# Patient Record
Sex: MALE | Race: WHITE | Employment: FULL TIME | URBAN - METROPOLITAN AREA
[De-identification: names, ages, dates, MRNs, and addresses within clinical notes are randomized per-mention and may not be internally consistent; named-entity substitution may affect disease eponyms.]

---

## 2018-03-05 ENCOUNTER — HOSPITAL ENCOUNTER (OUTPATIENT)
Dept: ULTRASOUND IMAGING | Age: 48
Discharge: HOME | End: 2018-03-05

## 2018-03-05 DIAGNOSIS — Z12.71 ENCOUNTER FOR SCREENING FOR MALIGNANT NEOPLASM OF TESTIS: ICD-10-CM

## 2018-10-10 ENCOUNTER — HOSPITAL ENCOUNTER (INPATIENT)
Age: 48
LOS: 7 days | Discharge: HOME | DRG: 432 | End: 2018-10-18
Attending: EMERGENCY MEDICINE | Admitting: INTERNAL MEDICINE
Payer: COMMERCIAL

## 2018-10-10 DIAGNOSIS — N30.00 ACUTE CYSTITIS WITHOUT HEMATURIA: ICD-10-CM

## 2018-10-10 DIAGNOSIS — F10.930 ALCOHOL WITHDRAWAL SYNDROME WITHOUT COMPLICATION (HCC): ICD-10-CM

## 2018-10-10 DIAGNOSIS — K70.10 ALCOHOLIC HEPATITIS, UNSPECIFIED WHETHER ASCITES PRESENT: Primary | ICD-10-CM

## 2018-10-10 PROCEDURE — 85027 COMPLETE CBC AUTOMATED: CPT

## 2018-10-10 PROCEDURE — 99284 EMERGENCY DEPT VISIT MOD MDM: CPT

## 2018-10-10 PROCEDURE — 86708 HEPATITIS A ANTIBODY: CPT

## 2018-10-10 PROCEDURE — G0480 DRUG TEST DEF 1-7 CLASSES: HCPCS

## 2018-10-10 PROCEDURE — 80053 COMPREHEN METABOLIC PANEL: CPT

## 2018-10-10 PROCEDURE — 87086 URINE CULTURE/COLONY COUNT: CPT

## 2018-10-10 PROCEDURE — 82977 ASSAY OF GGT: CPT

## 2018-10-10 PROCEDURE — 81001 URINALYSIS AUTO W/SCOPE: CPT

## 2018-10-10 RX ORDER — BUPROPION HYDROCHLORIDE 150 MG/1
150 TABLET, EXTENDED RELEASE ORAL ONCE
Status: ON HOLD | COMMUNITY
End: 2018-10-18 | Stop reason: HOSPADM

## 2018-10-10 RX ORDER — METOPROLOL SUCCINATE 25 MG/1
TABLET, EXTENDED RELEASE ORAL DAILY
Status: ON HOLD | COMMUNITY
End: 2018-11-20 | Stop reason: HOSPADM

## 2018-10-10 ASSESSMENT — PAIN SCALES - GENERAL: PAINLEVEL_OUTOF10: 1

## 2018-10-11 ENCOUNTER — APPOINTMENT (OUTPATIENT)
Dept: ULTRASOUND IMAGING | Age: 48
DRG: 432 | End: 2018-10-11
Payer: COMMERCIAL

## 2018-10-11 PROBLEM — R10.11 RUQ PAIN: Status: ACTIVE | Noted: 2018-10-11

## 2018-10-11 PROBLEM — E80.6 HYPERBILIRUBINEMIA: Status: ACTIVE | Noted: 2018-10-11

## 2018-10-11 LAB
A/G RATIO: 0.7 (ref 1.1–2.2)
A/G RATIO: 0.8 (ref 1.1–2.2)
ACETAMINOPHEN LEVEL: <5 UG/ML (ref 10–30)
ALBUMIN SERPL-MCNC: 2.8 G/DL (ref 3.4–5)
ALBUMIN SERPL-MCNC: 2.8 G/DL (ref 3.4–5)
ALP BLD-CCNC: 237 U/L (ref 40–129)
ALP BLD-CCNC: 241 U/L (ref 40–129)
ALT SERPL-CCNC: 32 U/L (ref 10–40)
ALT SERPL-CCNC: 32 U/L (ref 10–40)
AMMONIA: 171 UMOL/L (ref 16–60)
ANION GAP SERPL CALCULATED.3IONS-SCNC: 13 MMOL/L (ref 3–16)
ANION GAP SERPL CALCULATED.3IONS-SCNC: 15 MMOL/L (ref 3–16)
AST SERPL-CCNC: 123 U/L (ref 15–37)
AST SERPL-CCNC: 129 U/L (ref 15–37)
BACTERIA: ABNORMAL /HPF
BILIRUB SERPL-MCNC: 20.2 MG/DL (ref 0–1)
BILIRUB SERPL-MCNC: 20.2 MG/DL (ref 0–1)
BILIRUBIN URINE: ABNORMAL
BLOOD, URINE: ABNORMAL
BUN BLDV-MCNC: 4 MG/DL (ref 7–20)
BUN BLDV-MCNC: 6 MG/DL (ref 7–20)
CALCIUM SERPL-MCNC: 8 MG/DL (ref 8.3–10.6)
CALCIUM SERPL-MCNC: 8.2 MG/DL (ref 8.3–10.6)
CASTS: ABNORMAL /LPF
CHLORIDE BLD-SCNC: 91 MMOL/L (ref 99–110)
CHLORIDE BLD-SCNC: 93 MMOL/L (ref 99–110)
CLARITY: ABNORMAL
CO2: 21 MMOL/L (ref 21–32)
CO2: 24 MMOL/L (ref 21–32)
COLOR: ABNORMAL
CREAT SERPL-MCNC: 1.2 MG/DL (ref 0.9–1.3)
CREAT SERPL-MCNC: <0.5 MG/DL (ref 0.9–1.3)
ETHANOL: 25 MG/DL (ref 0–0.08)
GAMMA GLUTAMYL TRANSFERASE: 478 U/L (ref 8–61)
GFR AFRICAN AMERICAN: >60
GFR AFRICAN AMERICAN: >60
GFR NON-AFRICAN AMERICAN: >60
GFR NON-AFRICAN AMERICAN: >60
GLOBULIN: 3.6 G/DL
GLOBULIN: 3.8 G/DL
GLUCOSE BLD-MCNC: 119 MG/DL (ref 70–99)
GLUCOSE BLD-MCNC: 81 MG/DL (ref 70–99)
GLUCOSE URINE: 100 MG/DL
HAV IGM SER IA-ACNC: NORMAL
HCT VFR BLD CALC: 43.2 % (ref 40.5–52.5)
HEMOGLOBIN: 15 G/DL (ref 13.5–17.5)
HEPATITIS B CORE IGM ANTIBODY: NORMAL
HEPATITIS B SURFACE ANTIGEN INTERPRETATION: NORMAL
HEPATITIS C ANTIBODY INTERPRETATION: NORMAL
INR BLD: 2.11 (ref 0.86–1.14)
KETONES, URINE: ABNORMAL MG/DL
LEUKOCYTE ESTERASE, URINE: ABNORMAL
MAGNESIUM: 1.5 MG/DL (ref 1.8–2.4)
MCH RBC QN AUTO: 34 PG (ref 26–34)
MCHC RBC AUTO-ENTMCNC: 34.8 G/DL (ref 31–36)
MCV RBC AUTO: 97.7 FL (ref 80–100)
MICROSCOPIC EXAMINATION: YES
MUCUS: ABNORMAL /LPF
NITRITE, URINE: POSITIVE
PDW BLD-RTO: 16.9 % (ref 12.4–15.4)
PH UA: 6.5
PLATELET # BLD: 90 K/UL (ref 135–450)
PLATELET SLIDE REVIEW: ABNORMAL
PMV BLD AUTO: 9 FL (ref 5–10.5)
POTASSIUM REFLEX MAGNESIUM: 3.5 MMOL/L (ref 3.5–5.1)
POTASSIUM SERPL-SCNC: 3.6 MMOL/L (ref 3.5–5.1)
PROTEIN UA: ABNORMAL MG/DL
PROTHROMBIN TIME: 24 SEC (ref 9.8–13)
RBC # BLD: 4.42 M/UL (ref 4.2–5.9)
RBC UA: ABNORMAL /HPF (ref 0–2)
SLIDE REVIEW: ABNORMAL
SODIUM BLD-SCNC: 128 MMOL/L (ref 136–145)
SODIUM BLD-SCNC: 129 MMOL/L (ref 136–145)
SPECIFIC GRAVITY UA: 1.02
TOTAL PROTEIN: 6.4 G/DL (ref 6.4–8.2)
TOTAL PROTEIN: 6.6 G/DL (ref 6.4–8.2)
URINE REFLEX TO CULTURE: YES
URINE TYPE: ABNORMAL
UROBILINOGEN, URINE: 4 E.U./DL
WBC # BLD: 10.8 K/UL (ref 4–11)
WBC UA: ABNORMAL /HPF (ref 0–5)

## 2018-10-11 PROCEDURE — 82140 ASSAY OF AMMONIA: CPT

## 2018-10-11 PROCEDURE — G0480 DRUG TEST DEF 1-7 CLASSES: HCPCS

## 2018-10-11 PROCEDURE — 85610 PROTHROMBIN TIME: CPT

## 2018-10-11 PROCEDURE — 6370000000 HC RX 637 (ALT 250 FOR IP): Performed by: INTERNAL MEDICINE

## 2018-10-11 PROCEDURE — 96365 THER/PROPH/DIAG IV INF INIT: CPT

## 2018-10-11 PROCEDURE — 80074 ACUTE HEPATITIS PANEL: CPT

## 2018-10-11 PROCEDURE — 2580000003 HC RX 258: Performed by: INTERNAL MEDICINE

## 2018-10-11 PROCEDURE — 6360000002 HC RX W HCPCS: Performed by: EMERGENCY MEDICINE

## 2018-10-11 PROCEDURE — 6360000002 HC RX W HCPCS: Performed by: INTERNAL MEDICINE

## 2018-10-11 PROCEDURE — 2580000003 HC RX 258: Performed by: EMERGENCY MEDICINE

## 2018-10-11 PROCEDURE — 2500000003 HC RX 250 WO HCPCS: Performed by: INTERNAL MEDICINE

## 2018-10-11 PROCEDURE — 76705 ECHO EXAM OF ABDOMEN: CPT

## 2018-10-11 PROCEDURE — 83735 ASSAY OF MAGNESIUM: CPT

## 2018-10-11 PROCEDURE — 80053 COMPREHEN METABOLIC PANEL: CPT

## 2018-10-11 PROCEDURE — 36415 COLL VENOUS BLD VENIPUNCTURE: CPT

## 2018-10-11 PROCEDURE — 87086 URINE CULTURE/COLONY COUNT: CPT

## 2018-10-11 PROCEDURE — 96375 TX/PRO/DX INJ NEW DRUG ADDON: CPT

## 2018-10-11 PROCEDURE — 1200000000 HC SEMI PRIVATE

## 2018-10-11 RX ORDER — LORAZEPAM 1 MG/1
2 TABLET ORAL
Status: DISCONTINUED | OUTPATIENT
Start: 2018-10-11 | End: 2018-10-12

## 2018-10-11 RX ORDER — SODIUM CHLORIDE 0.9 % (FLUSH) 0.9 %
10 SYRINGE (ML) INJECTION PRN
Status: DISCONTINUED | OUTPATIENT
Start: 2018-10-11 | End: 2018-10-18 | Stop reason: HOSPADM

## 2018-10-11 RX ORDER — LORAZEPAM 2 MG/ML
4 INJECTION INTRAMUSCULAR
Status: DISCONTINUED | OUTPATIENT
Start: 2018-10-11 | End: 2018-10-12

## 2018-10-11 RX ORDER — SODIUM CHLORIDE 0.9 % (FLUSH) 0.9 %
10 SYRINGE (ML) INJECTION EVERY 12 HOURS SCHEDULED
Status: DISCONTINUED | OUTPATIENT
Start: 2018-10-11 | End: 2018-10-18 | Stop reason: HOSPADM

## 2018-10-11 RX ORDER — LACTULOSE 10 G/15ML
20 SOLUTION ORAL 3 TIMES DAILY
Status: DISCONTINUED | OUTPATIENT
Start: 2018-10-11 | End: 2018-10-13

## 2018-10-11 RX ORDER — LORAZEPAM 2 MG/ML
3 INJECTION INTRAMUSCULAR
Status: DISCONTINUED | OUTPATIENT
Start: 2018-10-11 | End: 2018-10-12

## 2018-10-11 RX ORDER — ONDANSETRON 2 MG/ML
4 INJECTION INTRAMUSCULAR; INTRAVENOUS EVERY 6 HOURS PRN
Status: DISCONTINUED | OUTPATIENT
Start: 2018-10-11 | End: 2018-10-18 | Stop reason: HOSPADM

## 2018-10-11 RX ORDER — SODIUM CHLORIDE 0.9 % (FLUSH) 0.9 %
10 SYRINGE (ML) INJECTION PRN
Status: DISCONTINUED | OUTPATIENT
Start: 2018-10-11 | End: 2018-10-11

## 2018-10-11 RX ORDER — LORAZEPAM 2 MG/ML
2 INJECTION INTRAMUSCULAR
Status: DISCONTINUED | OUTPATIENT
Start: 2018-10-11 | End: 2018-10-12

## 2018-10-11 RX ORDER — MAGNESIUM SULFATE IN WATER 40 MG/ML
2 INJECTION, SOLUTION INTRAVENOUS ONCE
Status: COMPLETED | OUTPATIENT
Start: 2018-10-11 | End: 2018-10-11

## 2018-10-11 RX ORDER — LORAZEPAM 2 MG/ML
2 INJECTION INTRAMUSCULAR ONCE
Status: COMPLETED | OUTPATIENT
Start: 2018-10-11 | End: 2018-10-11

## 2018-10-11 RX ORDER — LISINOPRIL 20 MG/1
20 TABLET ORAL DAILY
Status: DISCONTINUED | OUTPATIENT
Start: 2018-10-11 | End: 2018-10-11

## 2018-10-11 RX ORDER — LORAZEPAM 2 MG/ML
1 INJECTION INTRAMUSCULAR
Status: DISCONTINUED | OUTPATIENT
Start: 2018-10-11 | End: 2018-10-12

## 2018-10-11 RX ORDER — CHLORDIAZEPOXIDE HYDROCHLORIDE 25 MG/1
25 CAPSULE, GELATIN COATED ORAL EVERY 6 HOURS
Status: DISCONTINUED | OUTPATIENT
Start: 2018-10-11 | End: 2018-10-14

## 2018-10-11 RX ORDER — LORAZEPAM 1 MG/1
1 TABLET ORAL
Status: DISCONTINUED | OUTPATIENT
Start: 2018-10-11 | End: 2018-10-12

## 2018-10-11 RX ORDER — LORAZEPAM 1 MG/1
4 TABLET ORAL
Status: DISCONTINUED | OUTPATIENT
Start: 2018-10-11 | End: 2018-10-12

## 2018-10-11 RX ORDER — PREDNISONE 20 MG/1
40 TABLET ORAL DAILY
Status: DISCONTINUED | OUTPATIENT
Start: 2018-10-11 | End: 2018-10-13

## 2018-10-11 RX ORDER — METOPROLOL SUCCINATE 25 MG/1
25 TABLET, EXTENDED RELEASE ORAL DAILY
Status: DISCONTINUED | OUTPATIENT
Start: 2018-10-11 | End: 2018-10-18 | Stop reason: HOSPADM

## 2018-10-11 RX ORDER — SODIUM CHLORIDE 0.9 % (FLUSH) 0.9 %
10 SYRINGE (ML) INJECTION EVERY 12 HOURS SCHEDULED
Status: DISCONTINUED | OUTPATIENT
Start: 2018-10-11 | End: 2018-10-11

## 2018-10-11 RX ORDER — LORAZEPAM 1 MG/1
3 TABLET ORAL
Status: DISCONTINUED | OUTPATIENT
Start: 2018-10-11 | End: 2018-10-12

## 2018-10-11 RX ADMIN — LORAZEPAM 1 MG: 1 TABLET ORAL at 19:38

## 2018-10-11 RX ADMIN — PREDNISONE 40 MG: 20 TABLET ORAL at 10:39

## 2018-10-11 RX ADMIN — SODIUM CHLORIDE, PRESERVATIVE FREE 10 ML: 5 INJECTION INTRAVENOUS at 05:09

## 2018-10-11 RX ADMIN — SODIUM CHLORIDE, PRESERVATIVE FREE 10 ML: 5 INJECTION INTRAVENOUS at 22:14

## 2018-10-11 RX ADMIN — LACTULOSE 20 G: 20 SOLUTION ORAL at 22:14

## 2018-10-11 RX ADMIN — CHLORDIAZEPOXIDE HYDROCHLORIDE 25 MG: 25 CAPSULE ORAL at 22:13

## 2018-10-11 RX ADMIN — CHLORDIAZEPOXIDE HYDROCHLORIDE 25 MG: 25 CAPSULE ORAL at 10:39

## 2018-10-11 RX ADMIN — SODIUM CHLORIDE, PRESERVATIVE FREE 10 ML: 5 INJECTION INTRAVENOUS at 15:44

## 2018-10-11 RX ADMIN — LACTULOSE 20 G: 20 SOLUTION ORAL at 15:42

## 2018-10-11 RX ADMIN — LORAZEPAM 1 MG: 2 INJECTION INTRAMUSCULAR; INTRAVENOUS at 05:06

## 2018-10-11 RX ADMIN — METOPROLOL SUCCINATE 25 MG: 25 TABLET, EXTENDED RELEASE ORAL at 10:38

## 2018-10-11 RX ADMIN — LACTULOSE 20 G: 20 SOLUTION ORAL at 10:38

## 2018-10-11 RX ADMIN — CEFTRIAXONE SODIUM 1 G: 1 INJECTION, POWDER, FOR SOLUTION INTRAMUSCULAR; INTRAVENOUS at 02:44

## 2018-10-11 RX ADMIN — LORAZEPAM 2 MG: 2 INJECTION INTRAMUSCULAR; INTRAVENOUS at 02:44

## 2018-10-11 RX ADMIN — CHLORDIAZEPOXIDE HYDROCHLORIDE 25 MG: 25 CAPSULE ORAL at 15:42

## 2018-10-11 RX ADMIN — MAGNESIUM SULFATE HEPTAHYDRATE 2 G: 40 INJECTION, SOLUTION INTRAVENOUS at 07:14

## 2018-10-11 RX ADMIN — FOLIC ACID: 5 INJECTION, SOLUTION INTRAMUSCULAR; INTRAVENOUS; SUBCUTANEOUS at 10:47

## 2018-10-11 ASSESSMENT — PAIN SCALES - GENERAL
PAINLEVEL_OUTOF10: 0
PAINLEVEL_OUTOF10: 1
PAINLEVEL_OUTOF10: 0

## 2018-10-11 NOTE — ED PROVIDER NOTES
Triage Chief Complaint:   Abdominal Pain (patient is a drinker, stopped drinking at 1900, patient is having right sided pain and radiating into side, patient has been cutting back alcohol intake, now at 7 beers daily.  )      THAD:  Jaya Story is a 41 y.o. male that presents with abdominal pain. Patient has right upper quadrant abdominal pain and is an alcoholic. He has become jaundiced. He has tried to withdraw himself from alcohol by reducing his daily usage but is in withdrawal as well. He is not vomiting but is nauseous and was tachycardic and modestly hypertensive. He is been to treatment for alcoholism in the past but did not get involved with 12 step program or Alcoholics Anonymous. Patient is otherwise healthy with hypertension and hypercholesterolemia as his only ongoing medical problems    ROS:  Review of systems was reviewed for 10 systems and is otherwise negative except as in the [address]    Past Medical History:   Diagnosis Date    Cholecystitis     Hypercholesteremia     Hypertension      Past Surgical History:   Procedure Laterality Date    CERVICAL FUSION  1992    C1 and C2    CHOLECYSTECTOMY      OTHER SURGICAL HISTORY  8/30/13    OPEN CHOLECYSTECTOMY     Family History   Problem Relation Age of Onset    Heart Disease Father      Social History     Social History    Marital status:      Spouse name: N/A    Number of children: N/A    Years of education: N/A     Occupational History    Not on file.      Social History Main Topics    Smoking status: Former Smoker     Quit date: 12/29/2012    Smokeless tobacco: Never Used    Alcohol use 4.2 oz/week     7 Cans of beer per week    Drug use: No    Sexual activity: No     Other Topics Concern    Not on file     Social History Narrative    No narrative on file     Current Facility-Administered Medications   Medication Dose Route Frequency Provider Last Rate Last Dose    cefTRIAXone (ROCEPHIN) 1 g IVPB in 50 mL D5W minibag clarification.       (Please note that portions of this note may have been completed with a voice recognition program. Efforts were made to edit the dictations but occasionally words are mis-transcribed.)    MD Dru Miller MD  10/11/18 7245       Dru Parson MD  10/11/18 5924

## 2018-10-11 NOTE — ED NOTES
PS @ 0228  RE: Admission  Dr Luann Vickers @ [id number]     Tuesday Marcio Lebron  10/11/18 [phone]

## 2018-10-11 NOTE — ED NOTES
Patient continues to rest quietly in bed, denies pain, no distress noted, respirations even and unlabored. Urine collected and sent to lab, urine is tea colored, patient denies pain with urination.  Awaiting Dr. Farida Bishop to see patient     Jh Gibson RN  10/11/18 1566

## 2018-10-11 NOTE — H&P
Hospital Medicine History & Physical      PCP: No primary care provider on file. Date of Admission: 10/10/2018    Date of Service: Pt seen/examined on 10/11/2018 and Admitted to Inpatient with expected LOS greater than two midnights due to medical therapy. Chief Complaint:  Abdominal pain      History Of Present Illness:    41 y.o. male who presented to [facility] with abdominal pain. Patient reports that he was apparently normal 3 days back when he noticed abdominal pain, slight right upper quadrant, intensity 7/10, constant, no aggravating or relieving factors, non-radiating, associated with nausea but no vomiting. Denied any diarrhea, fevers chills, rigors, weight loss. Patient does report increased abdominal distention over the last 3 weeks. Also reports yellowing of his eye over the last 3 days. Patient does report he is a chronic alcoholic and originally he drank 12 beers every day. But over the last couple of days has cut down to 7 beers. He is a well-developed by profession, and during work days does not drink from morning to evening. But after he comes home he usually drinks all night. Does report he gets the shakes within 2 hours of having his last drink. Denies seizures or DTs in the past.  Has quit alcohol and been in rehab before but relapsed. He does report his urine is dark in color, but that his stool is regular brown. Denies any hematochezia or melena, or hematemesis. Patient's significant other at the bedside denies any confusion that she has seen with the patient, her increased sleepiness.     Past Medical History:          Diagnosis Date    Cholecystitis     Hypercholesteremia     Hypertension        Past Surgical History:          Procedure Laterality Date    CERVICAL FUSION  1992    C1 and C2    CHOLECYSTECTOMY      OTHER SURGICAL HISTORY  8/30/13    OPEN CHOLECYSTECTOMY       Medications Prior to Admission:      Prior to Admission medications    Medication

## 2018-10-12 LAB
A/G RATIO: 0.7 (ref 1.1–2.2)
ALBUMIN SERPL-MCNC: 2.6 G/DL (ref 3.4–5)
ALP BLD-CCNC: 209 U/L (ref 40–129)
ALT SERPL-CCNC: 31 U/L (ref 10–40)
ANION GAP SERPL CALCULATED.3IONS-SCNC: 13 MMOL/L (ref 3–16)
ANISOCYTOSIS: ABNORMAL
AST SERPL-CCNC: 119 U/L (ref 15–37)
ATYPICAL LYMPHOCYTE RELATIVE PERCENT: 1 % (ref 0–6)
BANDED NEUTROPHILS RELATIVE PERCENT: 8 % (ref 0–7)
BASOPHILS ABSOLUTE: 0 K/UL (ref 0–0.2)
BASOPHILS RELATIVE PERCENT: 0 %
BILIRUB SERPL-MCNC: 20.7 MG/DL (ref 0–1)
BUN BLDV-MCNC: 14 MG/DL (ref 7–20)
CALCIUM SERPL-MCNC: 7.8 MG/DL (ref 8.3–10.6)
CHLORIDE BLD-SCNC: 93 MMOL/L (ref 99–110)
CO2: 23 MMOL/L (ref 21–32)
CREAT SERPL-MCNC: 3 MG/DL (ref 0.9–1.3)
EOSINOPHILS ABSOLUTE: 0 K/UL (ref 0–0.6)
EOSINOPHILS RELATIVE PERCENT: 0 %
GFR AFRICAN AMERICAN: 27
GFR NON-AFRICAN AMERICAN: 23
GLOBULIN: 3.6 G/DL
GLUCOSE BLD-MCNC: 157 MG/DL (ref 70–99)
HAV AB SERPL IA-ACNC: POSITIVE
HCT VFR BLD CALC: 42.2 % (ref 40.5–52.5)
HEMOGLOBIN: 14.6 G/DL (ref 13.5–17.5)
LIPASE: 105 U/L (ref 13–60)
LYMPHOCYTES ABSOLUTE: 1.6 K/UL (ref 1–5.1)
LYMPHOCYTES RELATIVE PERCENT: 12 %
MAGNESIUM: 2.2 MG/DL (ref 1.8–2.4)
MCH RBC QN AUTO: 34.2 PG (ref 26–34)
MCHC RBC AUTO-ENTMCNC: 34.6 G/DL (ref 31–36)
MCV RBC AUTO: 98.7 FL (ref 80–100)
MONOCYTES ABSOLUTE: 0.6 K/UL (ref 0–1.3)
MONOCYTES RELATIVE PERCENT: 5 %
NEUTROPHILS ABSOLUTE: 10.2 K/UL (ref 1.7–7.7)
NEUTROPHILS RELATIVE PERCENT: 74 %
PDW BLD-RTO: 16.6 % (ref 12.4–15.4)
PLATELET # BLD: 87 K/UL (ref 135–450)
PLATELET SLIDE REVIEW: ABNORMAL
PMV BLD AUTO: 9.4 FL (ref 5–10.5)
POTASSIUM REFLEX MAGNESIUM: 4.3 MMOL/L (ref 3.5–5.1)
RBC # BLD: 4.27 M/UL (ref 4.2–5.9)
SLIDE REVIEW: ABNORMAL
SODIUM BLD-SCNC: 129 MMOL/L (ref 136–145)
TARGET CELLS: ABNORMAL
TOTAL PROTEIN: 6.2 G/DL (ref 6.4–8.2)
URINE CULTURE, ROUTINE: NORMAL
URINE CULTURE, ROUTINE: NORMAL
WBC # BLD: 12.4 K/UL (ref 4–11)

## 2018-10-12 PROCEDURE — C9113 INJ PANTOPRAZOLE SODIUM, VIA: HCPCS | Performed by: INTERNAL MEDICINE

## 2018-10-12 PROCEDURE — 83735 ASSAY OF MAGNESIUM: CPT

## 2018-10-12 PROCEDURE — 6370000000 HC RX 637 (ALT 250 FOR IP): Performed by: INTERNAL MEDICINE

## 2018-10-12 PROCEDURE — 83690 ASSAY OF LIPASE: CPT

## 2018-10-12 PROCEDURE — 1200000000 HC SEMI PRIVATE

## 2018-10-12 PROCEDURE — 2500000003 HC RX 250 WO HCPCS: Performed by: INTERNAL MEDICINE

## 2018-10-12 PROCEDURE — 36415 COLL VENOUS BLD VENIPUNCTURE: CPT

## 2018-10-12 PROCEDURE — 85025 COMPLETE CBC W/AUTO DIFF WBC: CPT

## 2018-10-12 PROCEDURE — 80053 COMPREHEN METABOLIC PANEL: CPT

## 2018-10-12 PROCEDURE — 2580000003 HC RX 258: Performed by: INTERNAL MEDICINE

## 2018-10-12 PROCEDURE — 6360000002 HC RX W HCPCS: Performed by: INTERNAL MEDICINE

## 2018-10-12 RX ORDER — 0.9 % SODIUM CHLORIDE 0.9 %
10 VIAL (ML) INJECTION DAILY
Status: DISCONTINUED | OUTPATIENT
Start: 2018-10-12 | End: 2018-10-18 | Stop reason: HOSPADM

## 2018-10-12 RX ORDER — PANTOPRAZOLE SODIUM 40 MG/10ML
40 INJECTION, POWDER, LYOPHILIZED, FOR SOLUTION INTRAVENOUS DAILY
Status: DISCONTINUED | OUTPATIENT
Start: 2018-10-12 | End: 2018-10-12 | Stop reason: SDUPTHER

## 2018-10-12 RX ORDER — OXYCODONE HYDROCHLORIDE 5 MG/1
2.5 TABLET ORAL EVERY 4 HOURS PRN
Status: DISCONTINUED | OUTPATIENT
Start: 2018-10-12 | End: 2018-10-13

## 2018-10-12 RX ORDER — LORAZEPAM 1 MG/1
2 TABLET ORAL EVERY 4 HOURS PRN
Status: DISCONTINUED | OUTPATIENT
Start: 2018-10-12 | End: 2018-10-13

## 2018-10-12 RX ORDER — PANTOPRAZOLE SODIUM 40 MG/10ML
40 INJECTION, POWDER, LYOPHILIZED, FOR SOLUTION INTRAVENOUS DAILY
Status: DISCONTINUED | OUTPATIENT
Start: 2018-10-12 | End: 2018-10-18 | Stop reason: HOSPADM

## 2018-10-12 RX ADMIN — OXYCODONE HYDROCHLORIDE 2.5 MG: 5 TABLET ORAL at 11:22

## 2018-10-12 RX ADMIN — METOPROLOL SUCCINATE 25 MG: 25 TABLET, EXTENDED RELEASE ORAL at 08:38

## 2018-10-12 RX ADMIN — CHLORDIAZEPOXIDE HYDROCHLORIDE 25 MG: 25 CAPSULE ORAL at 08:35

## 2018-10-12 RX ADMIN — LACTULOSE 20 G: 20 SOLUTION ORAL at 22:07

## 2018-10-12 RX ADMIN — CEFTRIAXONE SODIUM 1 G: 1 INJECTION, POWDER, FOR SOLUTION INTRAMUSCULAR; INTRAVENOUS at 04:04

## 2018-10-12 RX ADMIN — SODIUM CHLORIDE, PRESERVATIVE FREE 10 ML: 5 INJECTION INTRAVENOUS at 22:07

## 2018-10-12 RX ADMIN — OXYCODONE HYDROCHLORIDE 2.5 MG: 5 TABLET ORAL at 23:20

## 2018-10-12 RX ADMIN — CHLORDIAZEPOXIDE HYDROCHLORIDE 25 MG: 25 CAPSULE ORAL at 15:51

## 2018-10-12 RX ADMIN — LORAZEPAM 2 MG: 1 TABLET ORAL at 13:03

## 2018-10-12 RX ADMIN — FOLIC ACID: 5 INJECTION, SOLUTION INTRAMUSCULAR; INTRAVENOUS; SUBCUTANEOUS at 08:31

## 2018-10-12 RX ADMIN — SODIUM CHLORIDE, PRESERVATIVE FREE 10 ML: 5 INJECTION INTRAVENOUS at 04:05

## 2018-10-12 RX ADMIN — LACTULOSE 20 G: 20 SOLUTION ORAL at 15:51

## 2018-10-12 RX ADMIN — CHLORDIAZEPOXIDE HYDROCHLORIDE 25 MG: 25 CAPSULE ORAL at 20:21

## 2018-10-12 RX ADMIN — PREDNISONE 40 MG: 20 TABLET ORAL at 08:37

## 2018-10-12 RX ADMIN — PANTOPRAZOLE SODIUM 40 MG: 40 INJECTION, POWDER, FOR SOLUTION INTRAVENOUS at 12:34

## 2018-10-12 RX ADMIN — CHLORDIAZEPOXIDE HYDROCHLORIDE 25 MG: 25 CAPSULE ORAL at 04:05

## 2018-10-12 RX ADMIN — LACTULOSE 20 G: 20 SOLUTION ORAL at 08:48

## 2018-10-12 RX ADMIN — SODIUM CHLORIDE, PRESERVATIVE FREE 10 ML: 5 INJECTION INTRAVENOUS at 12:35

## 2018-10-12 ASSESSMENT — PAIN SCALES - GENERAL
PAINLEVEL_OUTOF10: 5
PAINLEVEL_OUTOF10: 3
PAINLEVEL_OUTOF10: 8
PAINLEVEL_OUTOF10: 5

## 2018-10-12 ASSESSMENT — PAIN DESCRIPTION - PAIN TYPE
TYPE: ACUTE PAIN
TYPE: ACUTE PAIN

## 2018-10-13 ENCOUNTER — APPOINTMENT (OUTPATIENT)
Dept: ULTRASOUND IMAGING | Age: 48
DRG: 432 | End: 2018-10-13
Payer: COMMERCIAL

## 2018-10-13 LAB
A/G RATIO: 0.7 (ref 1.1–2.2)
ALBUMIN SERPL-MCNC: 2.4 G/DL (ref 3.4–5)
ALP BLD-CCNC: 199 U/L (ref 40–129)
ALT SERPL-CCNC: 30 U/L (ref 10–40)
AMMONIA: 114 UMOL/L (ref 16–60)
ANION GAP SERPL CALCULATED.3IONS-SCNC: 12 MMOL/L (ref 3–16)
AST SERPL-CCNC: 93 U/L (ref 15–37)
BACTERIA: ABNORMAL /HPF
BILIRUB SERPL-MCNC: 18.5 MG/DL (ref 0–1)
BILIRUBIN URINE: ABNORMAL
BLOOD, URINE: ABNORMAL
BUN BLDV-MCNC: 19 MG/DL (ref 7–20)
CALCIUM SERPL-MCNC: 8 MG/DL (ref 8.3–10.6)
CASTS: ABNORMAL /LPF
CHLORIDE BLD-SCNC: 100 MMOL/L (ref 99–110)
CHLORIDE URINE RANDOM: 33 MMOL/L
CLARITY: CLEAR
CO2: 22 MMOL/L (ref 21–32)
COLOR: ABNORMAL
CREAT SERPL-MCNC: 2.4 MG/DL (ref 0.9–1.3)
CREATININE URINE: 220.5 MG/DL (ref 39–259)
EPITHELIAL CELLS, UA: ABNORMAL /HPF
GFR AFRICAN AMERICAN: 35
GFR NON-AFRICAN AMERICAN: 29
GLOBULIN: 3.5 G/DL
GLUCOSE BLD-MCNC: 124 MG/DL (ref 70–99)
GLUCOSE URINE: NEGATIVE MG/DL
KETONES, URINE: NEGATIVE MG/DL
LEUKOCYTE ESTERASE, URINE: NEGATIVE
MAGNESIUM: 2.4 MG/DL (ref 1.8–2.4)
MICROSCOPIC EXAMINATION: YES
NITRITE, URINE: NEGATIVE
PH UA: 6
POTASSIUM REFLEX MAGNESIUM: 3.7 MMOL/L (ref 3.5–5.1)
POTASSIUM, UR: 23.8 MMOL/L
PROTEIN UA: 30 MG/DL
RBC UA: ABNORMAL /HPF (ref 0–2)
RENAL EPITHELIAL, UA: ABNORMAL /HPF
SODIUM BLD-SCNC: 134 MMOL/L (ref 136–145)
SODIUM URINE: 51 MMOL/L
SPECIFIC GRAVITY UA: 1.02
TOTAL PROTEIN: 5.9 G/DL (ref 6.4–8.2)
UROBILINOGEN, URINE: 1 E.U./DL
WBC UA: ABNORMAL /HPF (ref 0–5)

## 2018-10-13 PROCEDURE — 80053 COMPREHEN METABOLIC PANEL: CPT

## 2018-10-13 PROCEDURE — 2580000003 HC RX 258: Performed by: INTERNAL MEDICINE

## 2018-10-13 PROCEDURE — 84300 ASSAY OF URINE SODIUM: CPT

## 2018-10-13 PROCEDURE — C9113 INJ PANTOPRAZOLE SODIUM, VIA: HCPCS | Performed by: INTERNAL MEDICINE

## 2018-10-13 PROCEDURE — 76770 US EXAM ABDO BACK WALL COMP: CPT

## 2018-10-13 PROCEDURE — 6370000000 HC RX 637 (ALT 250 FOR IP): Performed by: INTERNAL MEDICINE

## 2018-10-13 PROCEDURE — 83735 ASSAY OF MAGNESIUM: CPT

## 2018-10-13 PROCEDURE — P9047 ALBUMIN (HUMAN), 25%, 50ML: HCPCS | Performed by: INTERNAL MEDICINE

## 2018-10-13 PROCEDURE — 6360000002 HC RX W HCPCS: Performed by: INTERNAL MEDICINE

## 2018-10-13 PROCEDURE — 81001 URINALYSIS AUTO W/SCOPE: CPT

## 2018-10-13 PROCEDURE — 82436 ASSAY OF URINE CHLORIDE: CPT

## 2018-10-13 PROCEDURE — 36415 COLL VENOUS BLD VENIPUNCTURE: CPT

## 2018-10-13 PROCEDURE — 1200000000 HC SEMI PRIVATE

## 2018-10-13 PROCEDURE — 82570 ASSAY OF URINE CREATININE: CPT

## 2018-10-13 PROCEDURE — 84133 ASSAY OF URINE POTASSIUM: CPT

## 2018-10-13 PROCEDURE — 82140 ASSAY OF AMMONIA: CPT

## 2018-10-13 RX ORDER — ALBUMIN (HUMAN) 12.5 G/50ML
25 SOLUTION INTRAVENOUS EVERY 8 HOURS
Status: COMPLETED | OUTPATIENT
Start: 2018-10-13 | End: 2018-10-14

## 2018-10-13 RX ORDER — PREDNISOLONE 15 MG/5 ML
40 SOLUTION, ORAL ORAL DAILY
Status: DISCONTINUED | OUTPATIENT
Start: 2018-10-13 | End: 2018-10-18 | Stop reason: HOSPADM

## 2018-10-13 RX ORDER — SODIUM CHLORIDE 9 MG/ML
INJECTION, SOLUTION INTRAVENOUS CONTINUOUS
Status: DISCONTINUED | OUTPATIENT
Start: 2018-10-13 | End: 2018-10-16

## 2018-10-13 RX ADMIN — ALBUMIN (HUMAN) 25 G: 0.25 INJECTION, SOLUTION INTRAVENOUS at 11:28

## 2018-10-13 RX ADMIN — CEFTRIAXONE SODIUM 1 G: 1 INJECTION, POWDER, FOR SOLUTION INTRAMUSCULAR; INTRAVENOUS at 02:08

## 2018-10-13 RX ADMIN — OXYCODONE HYDROCHLORIDE 2.5 MG: 5 TABLET ORAL at 12:44

## 2018-10-13 RX ADMIN — CHLORDIAZEPOXIDE HYDROCHLORIDE 25 MG: 25 CAPSULE ORAL at 20:59

## 2018-10-13 RX ADMIN — SODIUM CHLORIDE: 9 INJECTION, SOLUTION INTRAVENOUS at 20:59

## 2018-10-13 RX ADMIN — PREDNISOLONE 40 MG: 15 SOLUTION ORAL at 12:44

## 2018-10-13 RX ADMIN — SODIUM CHLORIDE, PRESERVATIVE FREE 10 ML: 5 INJECTION INTRAVENOUS at 11:33

## 2018-10-13 RX ADMIN — SODIUM CHLORIDE, PRESERVATIVE FREE 10 ML: 5 INJECTION INTRAVENOUS at 11:31

## 2018-10-13 RX ADMIN — CHLORDIAZEPOXIDE HYDROCHLORIDE 25 MG: 25 CAPSULE ORAL at 02:08

## 2018-10-13 RX ADMIN — METOPROLOL SUCCINATE 25 MG: 25 TABLET, EXTENDED RELEASE ORAL at 11:29

## 2018-10-13 RX ADMIN — CHLORDIAZEPOXIDE HYDROCHLORIDE 25 MG: 25 CAPSULE ORAL at 11:29

## 2018-10-13 RX ADMIN — PANTOPRAZOLE SODIUM 40 MG: 40 INJECTION, POWDER, FOR SOLUTION INTRAVENOUS at 11:31

## 2018-10-13 RX ADMIN — SODIUM CHLORIDE: 9 INJECTION, SOLUTION INTRAVENOUS at 11:31

## 2018-10-13 RX ADMIN — ALBUMIN (HUMAN) 25 G: 0.25 INJECTION, SOLUTION INTRAVENOUS at 19:45

## 2018-10-13 ASSESSMENT — PAIN SCALES - GENERAL: PAINLEVEL_OUTOF10: 8

## 2018-10-14 LAB
A/G RATIO: 0.8 (ref 1.1–2.2)
ALBUMIN SERPL-MCNC: 2.8 G/DL (ref 3.4–5)
ALP BLD-CCNC: 206 U/L (ref 40–129)
ALT SERPL-CCNC: 35 U/L (ref 10–40)
ANION GAP SERPL CALCULATED.3IONS-SCNC: 12 MMOL/L (ref 3–16)
AST SERPL-CCNC: 95 U/L (ref 15–37)
BASOPHILS ABSOLUTE: 0 K/UL (ref 0–0.2)
BASOPHILS RELATIVE PERCENT: 0.4 %
BILIRUB SERPL-MCNC: 18.2 MG/DL (ref 0–1)
BUN BLDV-MCNC: 20 MG/DL (ref 7–20)
CALCIUM SERPL-MCNC: 8 MG/DL (ref 8.3–10.6)
CHLORIDE BLD-SCNC: 102 MMOL/L (ref 99–110)
CO2: 22 MMOL/L (ref 21–32)
CREAT SERPL-MCNC: 1.4 MG/DL (ref 0.9–1.3)
EOSINOPHILS ABSOLUTE: 0 K/UL (ref 0–0.6)
EOSINOPHILS RELATIVE PERCENT: 0.3 %
GFR AFRICAN AMERICAN: >60
GFR NON-AFRICAN AMERICAN: 55
GLOBULIN: 3.4 G/DL
GLUCOSE BLD-MCNC: 126 MG/DL (ref 70–99)
HCT VFR BLD CALC: 41.5 % (ref 40.5–52.5)
HEMOGLOBIN: 14.3 G/DL (ref 13.5–17.5)
LYMPHOCYTES ABSOLUTE: 0.5 K/UL (ref 1–5.1)
LYMPHOCYTES RELATIVE PERCENT: 5.5 %
MAGNESIUM: 2.4 MG/DL (ref 1.8–2.4)
MCH RBC QN AUTO: 34.1 PG (ref 26–34)
MCHC RBC AUTO-ENTMCNC: 34.4 G/DL (ref 31–36)
MCV RBC AUTO: 98.9 FL (ref 80–100)
MONOCYTES ABSOLUTE: 1.1 K/UL (ref 0–1.3)
MONOCYTES RELATIVE PERCENT: 11.6 %
NEUTROPHILS ABSOLUTE: 7.8 K/UL (ref 1.7–7.7)
NEUTROPHILS RELATIVE PERCENT: 82.2 %
PDW BLD-RTO: 17.4 % (ref 12.4–15.4)
PLATELET # BLD: 86 K/UL (ref 135–450)
PMV BLD AUTO: 9.1 FL (ref 5–10.5)
POTASSIUM SERPL-SCNC: 3.8 MMOL/L (ref 3.5–5.1)
RBC # BLD: 4.19 M/UL (ref 4.2–5.9)
SODIUM BLD-SCNC: 136 MMOL/L (ref 136–145)
TOTAL PROTEIN: 6.2 G/DL (ref 6.4–8.2)
WBC # BLD: 9.5 K/UL (ref 4–11)

## 2018-10-14 PROCEDURE — 36415 COLL VENOUS BLD VENIPUNCTURE: CPT

## 2018-10-14 PROCEDURE — 6370000000 HC RX 637 (ALT 250 FOR IP): Performed by: INTERNAL MEDICINE

## 2018-10-14 PROCEDURE — 1200000000 HC SEMI PRIVATE

## 2018-10-14 PROCEDURE — 85025 COMPLETE CBC W/AUTO DIFF WBC: CPT

## 2018-10-14 PROCEDURE — 6360000002 HC RX W HCPCS: Performed by: INTERNAL MEDICINE

## 2018-10-14 PROCEDURE — 83735 ASSAY OF MAGNESIUM: CPT

## 2018-10-14 PROCEDURE — 2580000003 HC RX 258: Performed by: INTERNAL MEDICINE

## 2018-10-14 PROCEDURE — C9113 INJ PANTOPRAZOLE SODIUM, VIA: HCPCS | Performed by: INTERNAL MEDICINE

## 2018-10-14 PROCEDURE — 80053 COMPREHEN METABOLIC PANEL: CPT

## 2018-10-14 PROCEDURE — P9047 ALBUMIN (HUMAN), 25%, 50ML: HCPCS | Performed by: INTERNAL MEDICINE

## 2018-10-14 RX ORDER — CHLORDIAZEPOXIDE HYDROCHLORIDE 25 MG/1
25 CAPSULE, GELATIN COATED ORAL EVERY 8 HOURS
Status: DISCONTINUED | OUTPATIENT
Start: 2018-10-14 | End: 2018-10-18 | Stop reason: HOSPADM

## 2018-10-14 RX ADMIN — CEFTRIAXONE SODIUM 1 G: 1 INJECTION, POWDER, FOR SOLUTION INTRAMUSCULAR; INTRAVENOUS at 02:00

## 2018-10-14 RX ADMIN — CHLORDIAZEPOXIDE HYDROCHLORIDE 25 MG: 25 CAPSULE ORAL at 08:40

## 2018-10-14 RX ADMIN — ONDANSETRON 4 MG: 2 INJECTION, SOLUTION INTRAMUSCULAR; INTRAVENOUS at 08:41

## 2018-10-14 RX ADMIN — ALBUMIN (HUMAN) 25 G: 0.25 INJECTION, SOLUTION INTRAVENOUS at 03:48

## 2018-10-14 RX ADMIN — SODIUM CHLORIDE, PRESERVATIVE FREE 10 ML: 5 INJECTION INTRAVENOUS at 08:41

## 2018-10-14 RX ADMIN — SODIUM CHLORIDE: 9 INJECTION, SOLUTION INTRAVENOUS at 17:03

## 2018-10-14 RX ADMIN — SODIUM CHLORIDE: 9 INJECTION, SOLUTION INTRAVENOUS at 22:30

## 2018-10-14 RX ADMIN — CHLORDIAZEPOXIDE HYDROCHLORIDE 25 MG: 25 CAPSULE ORAL at 17:01

## 2018-10-14 RX ADMIN — SODIUM CHLORIDE, PRESERVATIVE FREE 10 ML: 5 INJECTION INTRAVENOUS at 08:42

## 2018-10-14 RX ADMIN — METOPROLOL SUCCINATE 25 MG: 25 TABLET, EXTENDED RELEASE ORAL at 08:40

## 2018-10-14 RX ADMIN — PANTOPRAZOLE SODIUM 40 MG: 40 INJECTION, POWDER, FOR SOLUTION INTRAVENOUS at 08:40

## 2018-10-14 RX ADMIN — SODIUM CHLORIDE: 9 INJECTION, SOLUTION INTRAVENOUS at 02:00

## 2018-10-14 RX ADMIN — PREDNISOLONE 40 MG: 15 SOLUTION ORAL at 08:40

## 2018-10-14 ASSESSMENT — PAIN SCALES - GENERAL: PAINLEVEL_OUTOF10: 10

## 2018-10-14 ASSESSMENT — PAIN DESCRIPTION - PAIN TYPE: TYPE: ACUTE PAIN

## 2018-10-15 LAB
A/G RATIO: 0.8 (ref 1.1–2.2)
ALBUMIN SERPL-MCNC: 2.7 G/DL (ref 3.4–5)
ALP BLD-CCNC: 185 U/L (ref 40–129)
ALT SERPL-CCNC: 39 U/L (ref 10–40)
ANION GAP SERPL CALCULATED.3IONS-SCNC: 13 MMOL/L (ref 3–16)
AST SERPL-CCNC: 87 U/L (ref 15–37)
BASOPHILS ABSOLUTE: 0.1 K/UL (ref 0–0.2)
BASOPHILS RELATIVE PERCENT: 0.5 %
BILIRUB SERPL-MCNC: 16.1 MG/DL (ref 0–1)
BUN BLDV-MCNC: 17 MG/DL (ref 7–20)
CALCIUM SERPL-MCNC: 8 MG/DL (ref 8.3–10.6)
CHLORIDE BLD-SCNC: 102 MMOL/L (ref 99–110)
CO2: 23 MMOL/L (ref 21–32)
CREAT SERPL-MCNC: 1 MG/DL (ref 0.9–1.3)
EOSINOPHILS ABSOLUTE: 0 K/UL (ref 0–0.6)
EOSINOPHILS RELATIVE PERCENT: 0.3 %
GFR AFRICAN AMERICAN: >60
GFR NON-AFRICAN AMERICAN: >60
GLOBULIN: 3.2 G/DL
GLUCOSE BLD-MCNC: 115 MG/DL (ref 70–99)
HCT VFR BLD CALC: 41.7 % (ref 40.5–52.5)
HEMATOLOGY PATH CONSULT: NORMAL
HEMATOLOGY PATH CONSULT: YES
HEMOGLOBIN: 14.2 G/DL (ref 13.5–17.5)
LYMPHOCYTES ABSOLUTE: 0.8 K/UL (ref 1–5.1)
LYMPHOCYTES RELATIVE PERCENT: 8.2 %
MAGNESIUM: 2.2 MG/DL (ref 1.8–2.4)
MCH RBC QN AUTO: 33.5 PG (ref 26–34)
MCHC RBC AUTO-ENTMCNC: 34.1 G/DL (ref 31–36)
MCV RBC AUTO: 98.1 FL (ref 80–100)
MONOCYTES ABSOLUTE: 1.6 K/UL (ref 0–1.3)
MONOCYTES RELATIVE PERCENT: 15.6 %
NEUTROPHILS ABSOLUTE: 7.7 K/UL (ref 1.7–7.7)
NEUTROPHILS RELATIVE PERCENT: 75.4 %
PDW BLD-RTO: 17.4 % (ref 12.4–15.4)
PLATELET # BLD: 83 K/UL (ref 135–450)
PMV BLD AUTO: 8.8 FL (ref 5–10.5)
POTASSIUM SERPL-SCNC: 3.8 MMOL/L (ref 3.5–5.1)
RBC # BLD: 4.25 M/UL (ref 4.2–5.9)
SODIUM BLD-SCNC: 138 MMOL/L (ref 136–145)
TOTAL PROTEIN: 5.9 G/DL (ref 6.4–8.2)
WBC # BLD: 10.2 K/UL (ref 4–11)

## 2018-10-15 PROCEDURE — 85025 COMPLETE CBC W/AUTO DIFF WBC: CPT

## 2018-10-15 PROCEDURE — 6370000000 HC RX 637 (ALT 250 FOR IP): Performed by: INTERNAL MEDICINE

## 2018-10-15 PROCEDURE — 2580000003 HC RX 258: Performed by: INTERNAL MEDICINE

## 2018-10-15 PROCEDURE — C9113 INJ PANTOPRAZOLE SODIUM, VIA: HCPCS | Performed by: INTERNAL MEDICINE

## 2018-10-15 PROCEDURE — 36415 COLL VENOUS BLD VENIPUNCTURE: CPT

## 2018-10-15 PROCEDURE — 6360000002 HC RX W HCPCS: Performed by: NURSE PRACTITIONER

## 2018-10-15 PROCEDURE — 80053 COMPREHEN METABOLIC PANEL: CPT

## 2018-10-15 PROCEDURE — 83735 ASSAY OF MAGNESIUM: CPT

## 2018-10-15 PROCEDURE — 6360000002 HC RX W HCPCS: Performed by: INTERNAL MEDICINE

## 2018-10-15 PROCEDURE — 1200000000 HC SEMI PRIVATE

## 2018-10-15 RX ORDER — KETOROLAC TROMETHAMINE 30 MG/ML
15 INJECTION, SOLUTION INTRAMUSCULAR; INTRAVENOUS ONCE
Status: COMPLETED | OUTPATIENT
Start: 2018-10-15 | End: 2018-10-15

## 2018-10-15 RX ADMIN — KETOROLAC TROMETHAMINE 15 MG: 30 INJECTION, SOLUTION INTRAMUSCULAR at 21:58

## 2018-10-15 RX ADMIN — SODIUM CHLORIDE, PRESERVATIVE FREE 10 ML: 5 INJECTION INTRAVENOUS at 08:37

## 2018-10-15 RX ADMIN — CHLORDIAZEPOXIDE HYDROCHLORIDE 25 MG: 25 CAPSULE ORAL at 00:16

## 2018-10-15 RX ADMIN — SODIUM CHLORIDE: 9 INJECTION, SOLUTION INTRAVENOUS at 21:58

## 2018-10-15 RX ADMIN — CEFTRIAXONE SODIUM 1 G: 1 INJECTION, POWDER, FOR SOLUTION INTRAMUSCULAR; INTRAVENOUS at 01:34

## 2018-10-15 RX ADMIN — CHLORDIAZEPOXIDE HYDROCHLORIDE 25 MG: 25 CAPSULE ORAL at 08:38

## 2018-10-15 RX ADMIN — CHLORDIAZEPOXIDE HYDROCHLORIDE 25 MG: 25 CAPSULE ORAL at 17:07

## 2018-10-15 RX ADMIN — METOPROLOL SUCCINATE 25 MG: 25 TABLET, EXTENDED RELEASE ORAL at 08:37

## 2018-10-15 RX ADMIN — ENOXAPARIN SODIUM 40 MG: 40 INJECTION SUBCUTANEOUS at 08:38

## 2018-10-15 RX ADMIN — PREDNISOLONE 40 MG: 15 SOLUTION ORAL at 09:34

## 2018-10-15 RX ADMIN — PANTOPRAZOLE SODIUM 40 MG: 40 INJECTION, POWDER, FOR SOLUTION INTRAVENOUS at 08:37

## 2018-10-15 RX ADMIN — SODIUM CHLORIDE, PRESERVATIVE FREE 10 ML: 5 INJECTION INTRAVENOUS at 21:59

## 2018-10-15 ASSESSMENT — PAIN SCALES - GENERAL: PAINLEVEL_OUTOF10: 8

## 2018-10-16 ENCOUNTER — APPOINTMENT (OUTPATIENT)
Dept: GENERAL RADIOLOGY | Age: 48
DRG: 432 | End: 2018-10-16
Payer: COMMERCIAL

## 2018-10-16 LAB
A/G RATIO: 0.8 (ref 1.1–2.2)
ALBUMIN SERPL-MCNC: 2.7 G/DL (ref 3.4–5)
ALP BLD-CCNC: 187 U/L (ref 40–129)
ALT SERPL-CCNC: 48 U/L (ref 10–40)
ANION GAP SERPL CALCULATED.3IONS-SCNC: 10 MMOL/L (ref 3–16)
AST SERPL-CCNC: 93 U/L (ref 15–37)
BASOPHILS ABSOLUTE: 0 K/UL (ref 0–0.2)
BASOPHILS RELATIVE PERCENT: 0.5 %
BILIRUB SERPL-MCNC: 17.1 MG/DL (ref 0–1)
BUN BLDV-MCNC: 21 MG/DL (ref 7–20)
CALCIUM SERPL-MCNC: 8.1 MG/DL (ref 8.3–10.6)
CHLORIDE BLD-SCNC: 103 MMOL/L (ref 99–110)
CO2: 23 MMOL/L (ref 21–32)
CREAT SERPL-MCNC: 1 MG/DL (ref 0.9–1.3)
EOSINOPHILS ABSOLUTE: 0.1 K/UL (ref 0–0.6)
EOSINOPHILS RELATIVE PERCENT: 0.8 %
GFR AFRICAN AMERICAN: >60
GFR NON-AFRICAN AMERICAN: >60
GLOBULIN: 3.2 G/DL
GLUCOSE BLD-MCNC: 114 MG/DL (ref 70–99)
HCT VFR BLD CALC: 43.8 % (ref 40.5–52.5)
HEMOGLOBIN: 14.8 G/DL (ref 13.5–17.5)
INR BLD: 1.83 (ref 0.86–1.14)
LYMPHOCYTES ABSOLUTE: 0.8 K/UL (ref 1–5.1)
LYMPHOCYTES RELATIVE PERCENT: 7.9 %
MAGNESIUM: 2.1 MG/DL (ref 1.8–2.4)
MCH RBC QN AUTO: 33.8 PG (ref 26–34)
MCHC RBC AUTO-ENTMCNC: 33.7 G/DL (ref 31–36)
MCV RBC AUTO: 100.1 FL (ref 80–100)
MONOCYTES ABSOLUTE: 1.4 K/UL (ref 0–1.3)
MONOCYTES RELATIVE PERCENT: 14.5 %
NEUTROPHILS ABSOLUTE: 7.4 K/UL (ref 1.7–7.7)
NEUTROPHILS RELATIVE PERCENT: 76.3 %
PDW BLD-RTO: 18.7 % (ref 12.4–15.4)
PLATELET # BLD: 78 K/UL (ref 135–450)
PMV BLD AUTO: 8.4 FL (ref 5–10.5)
POTASSIUM SERPL-SCNC: 4.1 MMOL/L (ref 3.5–5.1)
PROTHROMBIN TIME: 20.9 SEC (ref 9.8–13)
RBC # BLD: 4.38 M/UL (ref 4.2–5.9)
SODIUM BLD-SCNC: 136 MMOL/L (ref 136–145)
TOTAL PROTEIN: 5.9 G/DL (ref 6.4–8.2)
WBC # BLD: 9.6 K/UL (ref 4–11)

## 2018-10-16 PROCEDURE — 6360000002 HC RX W HCPCS: Performed by: INTERNAL MEDICINE

## 2018-10-16 PROCEDURE — 36415 COLL VENOUS BLD VENIPUNCTURE: CPT

## 2018-10-16 PROCEDURE — 1200000000 HC SEMI PRIVATE

## 2018-10-16 PROCEDURE — 6370000000 HC RX 637 (ALT 250 FOR IP): Performed by: INTERNAL MEDICINE

## 2018-10-16 PROCEDURE — 6370000000 HC RX 637 (ALT 250 FOR IP): Performed by: NURSE PRACTITIONER

## 2018-10-16 PROCEDURE — C9113 INJ PANTOPRAZOLE SODIUM, VIA: HCPCS | Performed by: INTERNAL MEDICINE

## 2018-10-16 PROCEDURE — 85610 PROTHROMBIN TIME: CPT

## 2018-10-16 PROCEDURE — 2580000003 HC RX 258: Performed by: INTERNAL MEDICINE

## 2018-10-16 PROCEDURE — 83735 ASSAY OF MAGNESIUM: CPT

## 2018-10-16 PROCEDURE — 85025 COMPLETE CBC W/AUTO DIFF WBC: CPT

## 2018-10-16 PROCEDURE — 74022 RADEX COMPL AQT ABD SERIES: CPT

## 2018-10-16 PROCEDURE — 80053 COMPREHEN METABOLIC PANEL: CPT

## 2018-10-16 PROCEDURE — 6360000002 HC RX W HCPCS: Performed by: NURSE PRACTITIONER

## 2018-10-16 RX ORDER — OXYCODONE HYDROCHLORIDE 5 MG/1
5 TABLET ORAL EVERY 4 HOURS PRN
Status: DISCONTINUED | OUTPATIENT
Start: 2018-10-16 | End: 2018-10-18 | Stop reason: HOSPADM

## 2018-10-16 RX ORDER — HYDROMORPHONE HCL 110MG/55ML
0.5 PATIENT CONTROLLED ANALGESIA SYRINGE INTRAVENOUS EVERY 4 HOURS PRN
Status: DISCONTINUED | OUTPATIENT
Start: 2018-10-16 | End: 2018-10-18 | Stop reason: HOSPADM

## 2018-10-16 RX ORDER — MORPHINE SULFATE 2 MG/ML
2 INJECTION, SOLUTION INTRAMUSCULAR; INTRAVENOUS ONCE
Status: COMPLETED | OUTPATIENT
Start: 2018-10-16 | End: 2018-10-16

## 2018-10-16 RX ADMIN — METOPROLOL SUCCINATE 25 MG: 25 TABLET, EXTENDED RELEASE ORAL at 09:16

## 2018-10-16 RX ADMIN — OXYCODONE HYDROCHLORIDE 5 MG: 5 TABLET ORAL at 20:29

## 2018-10-16 RX ADMIN — MORPHINE SULFATE 2 MG: 2 INJECTION, SOLUTION INTRAMUSCULAR; INTRAVENOUS at 11:02

## 2018-10-16 RX ADMIN — PANTOPRAZOLE SODIUM 40 MG: 40 INJECTION, POWDER, FOR SOLUTION INTRAVENOUS at 09:16

## 2018-10-16 RX ADMIN — CHLORDIAZEPOXIDE HYDROCHLORIDE 25 MG: 25 CAPSULE ORAL at 00:21

## 2018-10-16 RX ADMIN — SODIUM CHLORIDE, PRESERVATIVE FREE 10 ML: 5 INJECTION INTRAVENOUS at 09:16

## 2018-10-16 RX ADMIN — SODIUM CHLORIDE, PRESERVATIVE FREE 10 ML: 5 INJECTION INTRAVENOUS at 20:29

## 2018-10-16 RX ADMIN — CHLORDIAZEPOXIDE HYDROCHLORIDE 25 MG: 25 CAPSULE ORAL at 17:17

## 2018-10-16 RX ADMIN — HYDROMORPHONE HYDROCHLORIDE 0.5 MG: 2 INJECTION INTRAMUSCULAR; INTRAVENOUS; SUBCUTANEOUS at 21:35

## 2018-10-16 RX ADMIN — PREDNISOLONE 40 MG: 15 SOLUTION ORAL at 09:16

## 2018-10-16 RX ADMIN — CEFTRIAXONE SODIUM 1 G: 1 INJECTION, POWDER, FOR SOLUTION INTRAMUSCULAR; INTRAVENOUS at 03:13

## 2018-10-16 RX ADMIN — CHLORDIAZEPOXIDE HYDROCHLORIDE 25 MG: 25 CAPSULE ORAL at 09:16

## 2018-10-16 ASSESSMENT — PAIN DESCRIPTION - PAIN TYPE: TYPE: ACUTE PAIN

## 2018-10-16 ASSESSMENT — PAIN SCALES - GENERAL
PAINLEVEL_OUTOF10: 10

## 2018-10-16 ASSESSMENT — PAIN DESCRIPTION - LOCATION: LOCATION: ABDOMEN

## 2018-10-17 ENCOUNTER — APPOINTMENT (OUTPATIENT)
Dept: ULTRASOUND IMAGING | Age: 48
DRG: 432 | End: 2018-10-17
Payer: COMMERCIAL

## 2018-10-17 ENCOUNTER — ANESTHESIA EVENT (OUTPATIENT)
Dept: ENDOSCOPY | Age: 48
DRG: 432 | End: 2018-10-17
Payer: COMMERCIAL

## 2018-10-17 ENCOUNTER — ANESTHESIA (OUTPATIENT)
Dept: ENDOSCOPY | Age: 48
DRG: 432 | End: 2018-10-17
Payer: COMMERCIAL

## 2018-10-17 VITALS — OXYGEN SATURATION: 91 % | DIASTOLIC BLOOD PRESSURE: 110 MMHG | SYSTOLIC BLOOD PRESSURE: 151 MMHG

## 2018-10-17 LAB
A/G RATIO: 0.7 (ref 1.1–2.2)
ALBUMIN SERPL-MCNC: 2.5 G/DL (ref 3.4–5)
ALP BLD-CCNC: 186 U/L (ref 40–129)
ALT SERPL-CCNC: 63 U/L (ref 10–40)
ANION GAP SERPL CALCULATED.3IONS-SCNC: 8 MMOL/L (ref 3–16)
ANISOCYTOSIS: ABNORMAL
APPEARANCE FLUID: CLEAR
AST SERPL-CCNC: 101 U/L (ref 15–37)
BANDED NEUTROPHILS RELATIVE PERCENT: 5 % (ref 0–7)
BASOPHILS ABSOLUTE: 0 K/UL (ref 0–0.2)
BASOPHILS RELATIVE PERCENT: 0 %
BILIRUB SERPL-MCNC: 19.7 MG/DL (ref 0–1)
BUN BLDV-MCNC: 21 MG/DL (ref 7–20)
CALCIUM SERPL-MCNC: 8.5 MG/DL (ref 8.3–10.6)
CELL COUNT FLUID TYPE: NORMAL
CHLORIDE BLD-SCNC: 103 MMOL/L (ref 99–110)
CLOT EVALUATION: NORMAL
CO2: 26 MMOL/L (ref 21–32)
COLOR FLUID: YELLOW
CREAT SERPL-MCNC: 0.7 MG/DL (ref 0.9–1.3)
EOSINOPHILS ABSOLUTE: 0.1 K/UL (ref 0–0.6)
EOSINOPHILS RELATIVE PERCENT: 1 %
GFR AFRICAN AMERICAN: >60
GFR NON-AFRICAN AMERICAN: >60
GLOBULIN: 3.6 G/DL
GLUCOSE BLD-MCNC: 96 MG/DL (ref 70–99)
HCT VFR BLD CALC: 46 % (ref 40.5–52.5)
HEMATOLOGY PATH CONSULT: NO
HEMOGLOBIN: 15.5 G/DL (ref 13.5–17.5)
LYMPHOCYTES ABSOLUTE: 0.6 K/UL (ref 1–5.1)
LYMPHOCYTES RELATIVE PERCENT: 6 %
LYMPHOCYTES, BODY FLUID: 10 %
MACROCYTES: ABNORMAL
MACROPHAGE FLUID: 54 %
MAGNESIUM: 2.2 MG/DL (ref 1.8–2.4)
MCH RBC QN AUTO: 33.7 PG (ref 26–34)
MCHC RBC AUTO-ENTMCNC: 33.8 G/DL (ref 31–36)
MCV RBC AUTO: 99.8 FL (ref 80–100)
MESOTHELIAL FLUID: 2 %
MONOCYTES ABSOLUTE: 1.6 K/UL (ref 0–1.3)
MONOCYTES RELATIVE PERCENT: 16 %
NEUTROPHIL, FLUID: 34 %
NEUTROPHILS ABSOLUTE: 7.5 K/UL (ref 1.7–7.7)
NEUTROPHILS RELATIVE PERCENT: 72 %
NUCLEATED CELLS FLUID: 41 /CUMM
NUMBER OF CELLS COUNTED FLUID: 100
PDW BLD-RTO: 18.2 % (ref 12.4–15.4)
PLATELET # BLD: 80 K/UL (ref 135–450)
PMV BLD AUTO: 8.6 FL (ref 5–10.5)
POTASSIUM SERPL-SCNC: 4.6 MMOL/L (ref 3.5–5.1)
RBC # BLD: 4.61 M/UL (ref 4.2–5.9)
RBC FLUID: 69 /CUMM
SODIUM BLD-SCNC: 137 MMOL/L (ref 136–145)
TARGET CELLS: ABNORMAL
TOTAL PROTEIN: 6.1 G/DL (ref 6.4–8.2)
WBC # BLD: 9.7 K/UL (ref 4–11)

## 2018-10-17 PROCEDURE — 2709999900 HC NON-CHARGEABLE SUPPLY: Performed by: INTERNAL MEDICINE

## 2018-10-17 PROCEDURE — 80053 COMPREHEN METABOLIC PANEL: CPT

## 2018-10-17 PROCEDURE — 7100000011 HC PHASE II RECOVERY - ADDTL 15 MIN: Performed by: INTERNAL MEDICINE

## 2018-10-17 PROCEDURE — 1200000000 HC SEMI PRIVATE

## 2018-10-17 PROCEDURE — C9113 INJ PANTOPRAZOLE SODIUM, VIA: HCPCS | Performed by: INTERNAL MEDICINE

## 2018-10-17 PROCEDURE — 6370000000 HC RX 637 (ALT 250 FOR IP): Performed by: INTERNAL MEDICINE

## 2018-10-17 PROCEDURE — 2580000003 HC RX 258: Performed by: INTERNAL MEDICINE

## 2018-10-17 PROCEDURE — 6370000000 HC RX 637 (ALT 250 FOR IP): Performed by: NURSE PRACTITIONER

## 2018-10-17 PROCEDURE — 6360000002 HC RX W HCPCS: Performed by: INTERNAL MEDICINE

## 2018-10-17 PROCEDURE — 3700000000 HC ANESTHESIA ATTENDED CARE: Performed by: INTERNAL MEDICINE

## 2018-10-17 PROCEDURE — 7100000010 HC PHASE II RECOVERY - FIRST 15 MIN: Performed by: INTERNAL MEDICINE

## 2018-10-17 PROCEDURE — 36415 COLL VENOUS BLD VENIPUNCTURE: CPT

## 2018-10-17 PROCEDURE — 6360000002 HC RX W HCPCS: Performed by: NURSE ANESTHETIST, CERTIFIED REGISTERED

## 2018-10-17 PROCEDURE — 89051 BODY FLUID CELL COUNT: CPT

## 2018-10-17 PROCEDURE — 85025 COMPLETE CBC W/AUTO DIFF WBC: CPT

## 2018-10-17 PROCEDURE — 3609017100 HC EGD: Performed by: INTERNAL MEDICINE

## 2018-10-17 PROCEDURE — 88305 TISSUE EXAM BY PATHOLOGIST: CPT

## 2018-10-17 PROCEDURE — 49083 ABD PARACENTESIS W/IMAGING: CPT

## 2018-10-17 PROCEDURE — 83735 ASSAY OF MAGNESIUM: CPT

## 2018-10-17 PROCEDURE — 3609012400 HC EGD TRANSORAL BIOPSY SINGLE/MULTIPLE: Performed by: INTERNAL MEDICINE

## 2018-10-17 PROCEDURE — 88112 CYTOPATH CELL ENHANCE TECH: CPT

## 2018-10-17 PROCEDURE — 0DB78ZX EXCISION OF STOMACH, PYLORUS, VIA NATURAL OR ARTIFICIAL OPENING ENDOSCOPIC, DIAGNOSTIC: ICD-10-PCS | Performed by: INTERNAL MEDICINE

## 2018-10-17 RX ORDER — PROPOFOL 10 MG/ML
INJECTION, EMULSION INTRAVENOUS PRN
Status: DISCONTINUED | OUTPATIENT
Start: 2018-10-17 | End: 2018-10-17 | Stop reason: SDUPTHER

## 2018-10-17 RX ORDER — MIDAZOLAM HYDROCHLORIDE 1 MG/ML
INJECTION INTRAMUSCULAR; INTRAVENOUS PRN
Status: DISCONTINUED | OUTPATIENT
Start: 2018-10-17 | End: 2018-10-17 | Stop reason: SDUPTHER

## 2018-10-17 RX ORDER — SODIUM CHLORIDE, SODIUM LACTATE, POTASSIUM CHLORIDE, CALCIUM CHLORIDE 600; 310; 30; 20 MG/100ML; MG/100ML; MG/100ML; MG/100ML
INJECTION, SOLUTION INTRAVENOUS CONTINUOUS
Status: DISCONTINUED | OUTPATIENT
Start: 2018-10-17 | End: 2018-10-18 | Stop reason: HOSPADM

## 2018-10-17 RX ADMIN — OXYCODONE HYDROCHLORIDE 5 MG: 5 TABLET ORAL at 07:03

## 2018-10-17 RX ADMIN — PROPOFOL 100 MG: 10 INJECTION, EMULSION INTRAVENOUS at 11:13

## 2018-10-17 RX ADMIN — CHLORDIAZEPOXIDE HYDROCHLORIDE 25 MG: 25 CAPSULE ORAL at 10:18

## 2018-10-17 RX ADMIN — PREDNISOLONE 40 MG: 15 SOLUTION ORAL at 13:13

## 2018-10-17 RX ADMIN — CHLORDIAZEPOXIDE HYDROCHLORIDE 25 MG: 25 CAPSULE ORAL at 01:57

## 2018-10-17 RX ADMIN — CEFTRIAXONE SODIUM 1 G: 1 INJECTION, POWDER, FOR SOLUTION INTRAMUSCULAR; INTRAVENOUS at 01:57

## 2018-10-17 RX ADMIN — SODIUM CHLORIDE, PRESERVATIVE FREE 10 ML: 5 INJECTION INTRAVENOUS at 20:14

## 2018-10-17 RX ADMIN — SODIUM CHLORIDE, PRESERVATIVE FREE 10 ML: 5 INJECTION INTRAVENOUS at 10:19

## 2018-10-17 RX ADMIN — MIDAZOLAM HYDROCHLORIDE 5 MG: 2 INJECTION, SOLUTION INTRAMUSCULAR; INTRAVENOUS at 11:13

## 2018-10-17 RX ADMIN — OXYCODONE HYDROCHLORIDE 5 MG: 5 TABLET ORAL at 17:55

## 2018-10-17 RX ADMIN — METOPROLOL SUCCINATE 25 MG: 25 TABLET, EXTENDED RELEASE ORAL at 10:18

## 2018-10-17 RX ADMIN — SODIUM CHLORIDE, POTASSIUM CHLORIDE, SODIUM LACTATE AND CALCIUM CHLORIDE: 600; 310; 30; 20 INJECTION, SOLUTION INTRAVENOUS at 11:01

## 2018-10-17 RX ADMIN — PANTOPRAZOLE SODIUM 40 MG: 40 INJECTION, POWDER, FOR SOLUTION INTRAVENOUS at 10:19

## 2018-10-17 RX ADMIN — CHLORDIAZEPOXIDE HYDROCHLORIDE 25 MG: 25 CAPSULE ORAL at 16:38

## 2018-10-17 ASSESSMENT — PAIN SCALES - GENERAL
PAINLEVEL_OUTOF10: 0
PAINLEVEL_OUTOF10: 6
PAINLEVEL_OUTOF10: 0
PAINLEVEL_OUTOF10: 10

## 2018-10-17 NOTE — CARE COORDINATION
Chart reviewed day 6. EGD today. Per nephrology FREDRICK, hyponatremia and HTN all improved. Substance abuse information provided. Please notify should patient have additional needs.  Cintia Bailey RN

## 2018-10-17 NOTE — H&P
Pre-sedation Assessment    History and Physical / Pre-Sedation Assessment  Patient:  Milly Phillips   :   1970     Intended Procedure: EGD      HPI: 40 yo w HTN and ETOH abuse admitted for jaundice and intermittent RUQ \"pinching\" pain. AST//32, twyla 20, INR 2.11, Plt 90 and elevated NH3. Hepatitis profile neg. RUQ USG w only trace ascites. Maddrey's DF score was 71. LFT's, INR and Cr are stable. Is having upper abd pain.     Plan:   1. Agree w steroids for severe alcoholic hepatitis on top of ETOH chronic liver dis. 2. F/U daily LFT's   3. Advised to abstain from ETOH  4. Will do EGD for upper abd pain and to R/O varices    Nurses notes reviewed and agreed. Medications reviewed  Allergies: No Known Allergies        Physical Exam:  Vital Signs: /79   Pulse 74   Temp 97.5 °F (36.4 °C) (Oral)   Resp 14   Ht 5' 10\" (1.778 m)   Wt 250 lb (113.4 kg)   SpO2 97%   BMI 35.87 kg/m²  Body mass index is 35.87 kg/m². Airway:Normal  Cardiac:Normal  Pulmonary:Normal  Abdomen:Normal  Specific to procedure: none      Pre-Procedure Assessment/Plan:  ASA 3 - Patient with moderate systemic disease with functional limitations  Malampati class II  Level of Sedation Plan:Deep sedation    Post Procedure plan: Return to same level of care    I assessed the patient and find that the patient is in satisfactory condition to proceed with the planned procedure and sedation plan. I have explained the risk, benefits, and alternatives to the procedure. The patient understands and agrees to proceed.   Yes    Nicolas Maddox MD       [phone]) [phone]        Nicolas Maddox  11:11 AM 10/17/2018

## 2018-10-17 NOTE — ANESTHESIA PRE PROCEDURE
GFRAA >60 10/17/2018    AGRATIO 0.7 10/17/2018    LABGLOM >60 10/17/2018    GLUCOSE 96 10/17/2018    PROT 6.1 10/17/2018    CALCIUM 8.5 10/17/2018    BILITOT 19.7 10/17/2018    ALKPHOS 186 10/17/2018     10/17/2018    ALT 63 10/17/2018       POC Tests: No results for input(s): POCGLU, POCNA, POCK, POCCL, POCBUN, POCHEMO, POCHCT in the last 72 hours. Coags:   Lab Results   Component Value Date    PROTIME 20.9 10/16/2018    INR 1.83 10/16/2018       HCG (If Applicable): No results found for: PREGTESTUR, PREGSERUM, HCG, HCGQUANT     ABGs: No results found for: PHART, PO2ART, LJR6ROE, SKB0GHR, BEART, J1OZTHBO     Type & Screen (If Applicable):  No results found for: LABABO, [address]    Anesthesia Evaluation  Patient summary reviewed no history of anesthetic complications:   Airway: Mallampati: II  TM distance: >3 FB   Neck ROM: full  Mouth opening: > = 3 FB Dental:          Pulmonary:Negative Pulmonary ROS                              Cardiovascular:    (+) hypertension:,                   Neuro/Psych:   (+) psychiatric history (ETOH abuse):            GI/Hepatic/Renal: Neg GI/Hepatic/Renal ROS       (-) GERD, liver disease and no renal disease       Endo/Other: Negative Endo/Other ROS       (-) diabetes mellitus               Abdominal:           Vascular: negative vascular ROS. Anesthesia Plan      TIVA     ASA 3       Induction: intravenous. Anesthetic plan and risks discussed with patient. Plan discussed with CRNA.                   Tobias Head MD   10/17/2018

## 2018-10-18 VITALS
WEIGHT: 250 LBS | TEMPERATURE: 98 F | SYSTOLIC BLOOD PRESSURE: 111 MMHG | HEART RATE: 80 BPM | BODY MASS INDEX: 35.79 KG/M2 | DIASTOLIC BLOOD PRESSURE: 73 MMHG | HEIGHT: 70 IN | OXYGEN SATURATION: 93 % | RESPIRATION RATE: 16 BRPM

## 2018-10-18 LAB
A/G RATIO: 0.8 (ref 1.1–2.2)
ALBUMIN SERPL-MCNC: 2.6 G/DL (ref 3.4–5)
ALP BLD-CCNC: 174 U/L (ref 40–129)
ALT SERPL-CCNC: 72 U/L (ref 10–40)
ANION GAP SERPL CALCULATED.3IONS-SCNC: 13 MMOL/L (ref 3–16)
ANISOCYTOSIS: ABNORMAL
AST SERPL-CCNC: 111 U/L (ref 15–37)
ATYPICAL LYMPHOCYTE RELATIVE PERCENT: 2 % (ref 0–6)
BANDED NEUTROPHILS RELATIVE PERCENT: 3 % (ref 0–7)
BASOPHILS ABSOLUTE: 0 K/UL (ref 0–0.2)
BASOPHILS RELATIVE PERCENT: 0 %
BILIRUB SERPL-MCNC: 19.7 MG/DL (ref 0–1)
BUN BLDV-MCNC: 21 MG/DL (ref 7–20)
CALCIUM SERPL-MCNC: 8.6 MG/DL (ref 8.3–10.6)
CHLORIDE BLD-SCNC: 103 MMOL/L (ref 99–110)
CO2: 24 MMOL/L (ref 21–32)
CREAT SERPL-MCNC: <0.5 MG/DL (ref 0.9–1.3)
CRENATED RBC'S: ABNORMAL
EOSINOPHILS ABSOLUTE: 0.2 K/UL (ref 0–0.6)
EOSINOPHILS RELATIVE PERCENT: 2 %
GFR AFRICAN AMERICAN: >60
GFR NON-AFRICAN AMERICAN: >60
GLOBULIN: 3.4 G/DL
GLUCOSE BLD-MCNC: 94 MG/DL (ref 70–99)
HCT VFR BLD CALC: 44.2 % (ref 40.5–52.5)
HEMATOLOGY PATH CONSULT: NO
HEMOGLOBIN: 15 G/DL (ref 13.5–17.5)
LYMPHOCYTES ABSOLUTE: 1.4 K/UL (ref 1–5.1)
LYMPHOCYTES RELATIVE PERCENT: 10 %
MAGNESIUM: 2.1 MG/DL (ref 1.8–2.4)
MCH RBC QN AUTO: 33.8 PG (ref 26–34)
MCHC RBC AUTO-ENTMCNC: 34 G/DL (ref 31–36)
MCV RBC AUTO: 99.6 FL (ref 80–100)
MONOCYTES ABSOLUTE: 0.9 K/UL (ref 0–1.3)
MONOCYTES RELATIVE PERCENT: 8 %
NEUTROPHILS ABSOLUTE: 8.8 K/UL (ref 1.7–7.7)
NEUTROPHILS RELATIVE PERCENT: 75 %
PDW BLD-RTO: 17.9 % (ref 12.4–15.4)
PLATELET # BLD: 85 K/UL (ref 135–450)
PLATELET SLIDE REVIEW: ABNORMAL
PMV BLD AUTO: 8.9 FL (ref 5–10.5)
POIKILOCYTES: ABNORMAL
POTASSIUM SERPL-SCNC: 4.2 MMOL/L (ref 3.5–5.1)
RBC # BLD: 4.43 M/UL (ref 4.2–5.9)
SLIDE REVIEW: ABNORMAL
SODIUM BLD-SCNC: 140 MMOL/L (ref 136–145)
TARGET CELLS: ABNORMAL
TOTAL PROTEIN: 6 G/DL (ref 6.4–8.2)
TOXIC GRANULATION: PRESENT
WBC # BLD: 11.3 K/UL (ref 4–11)

## 2018-10-18 PROCEDURE — 80053 COMPREHEN METABOLIC PANEL: CPT

## 2018-10-18 PROCEDURE — 6370000000 HC RX 637 (ALT 250 FOR IP): Performed by: INTERNAL MEDICINE

## 2018-10-18 PROCEDURE — 83735 ASSAY OF MAGNESIUM: CPT

## 2018-10-18 PROCEDURE — C9113 INJ PANTOPRAZOLE SODIUM, VIA: HCPCS | Performed by: INTERNAL MEDICINE

## 2018-10-18 PROCEDURE — 36415 COLL VENOUS BLD VENIPUNCTURE: CPT

## 2018-10-18 PROCEDURE — 85025 COMPLETE CBC W/AUTO DIFF WBC: CPT

## 2018-10-18 PROCEDURE — 2580000003 HC RX 258: Performed by: INTERNAL MEDICINE

## 2018-10-18 PROCEDURE — 6360000002 HC RX W HCPCS: Performed by: INTERNAL MEDICINE

## 2018-10-18 RX ORDER — PREDNISOLONE 15 MG/5 ML
SOLUTION, ORAL ORAL
Qty: 300 ML | Refills: 0 | Status: ON HOLD | OUTPATIENT
Start: 2018-10-18 | End: 2018-11-20 | Stop reason: HOSPADM

## 2018-10-18 RX ADMIN — SODIUM CHLORIDE, PRESERVATIVE FREE 10 ML: 5 INJECTION INTRAVENOUS at 09:44

## 2018-10-18 RX ADMIN — CHLORDIAZEPOXIDE HYDROCHLORIDE 25 MG: 25 CAPSULE ORAL at 01:04

## 2018-10-18 RX ADMIN — PANTOPRAZOLE SODIUM 40 MG: 40 INJECTION, POWDER, FOR SOLUTION INTRAVENOUS at 09:43

## 2018-10-18 RX ADMIN — PREDNISOLONE 40 MG: 15 SOLUTION ORAL at 09:46

## 2018-10-18 RX ADMIN — METOPROLOL SUCCINATE 25 MG: 25 TABLET, EXTENDED RELEASE ORAL at 09:43

## 2018-10-18 RX ADMIN — CHLORDIAZEPOXIDE HYDROCHLORIDE 25 MG: 25 CAPSULE ORAL at 09:43

## 2018-10-18 RX ADMIN — SODIUM CHLORIDE, PRESERVATIVE FREE 10 ML: 5 INJECTION INTRAVENOUS at 09:43

## 2018-10-18 NOTE — FLOWSHEET NOTE
Dr. Betsey Palomo paged and discussed with writer that he would like to monitor pt another night to see his labs tomorrow morning.

## 2018-10-18 NOTE — PROGRESS NOTES
Admitted this morning with complaints of right upper quadrant pain. In the emergency room he had the severe jaundice on examination and the tenderness on right upper quadrant, he was admitted to ICU for acute alcohol hepatitis,MDF 75, getting p.o. prednisone. Impending alcohol withdrawal requiring Ciwa and p.o. Librium  Banana bag. Acute hepatic encephalopathy requiring lactulose, ammonia levels 171. Ultrasonogram of abdomen shows diffuse hepatic steatosis, GI consulted. Complicated urinary tract infection, Rocephin for now. We will continue prednisone though he has urinary tract infection given the severity of presentation  Follow up urine culture.
Consult [facility]/called to [facility] on 10/11/18 at [address] [facility]
Department of Internal Medicine  Nephrology Progress Note        SUBJECTIVE:    We are following this patient for FREDRICK  The patient was seen and examined;resting comfortably, no new c/o    ROS: No fever or chills. Social: No family at bedside. Physical Exam:    VITALS:  /71   Pulse 71   Temp 97.5 °F (36.4 °C) (Oral)   Resp 18   Ht 5' 10\" (1.778 m)   Wt 250 lb (113.4 kg)   SpO2 95%   BMI 35.87 kg/m²     General appearance: Seems comfortable, no acute distress. Neck: Trachea midline, thyroid normal.   Lungs:  Non labored breathing, CTA to anterior auscultation. Heart:  S1S2 normal, rub or gallop. No peripheral edema. Abdomen: Distended, non-tender, no organomegaly. Skin: No lesions or rashes, warm to touch. DATA:    CBC:   Lab Results   Component Value Date    WBC 10.2 10/15/2018    RBC 4.25 10/15/2018    HGB 14.2 10/15/2018    HCT 41.7 10/15/2018    MCV 98.1 10/15/2018    MCH 33.5 10/15/2018    MCHC 34.1 10/15/2018    RDW 17.4 10/15/2018    PLT 83 10/15/2018    MPV 8.8 10/15/2018     BMP:    Lab Results   Component Value Date     10/15/2018    K 3.8 10/15/2018    K 3.7 10/13/2018     10/15/2018    CO2 23 10/15/2018    BUN 17 10/15/2018    LABALBU 2.7 10/15/2018    CREATININE 1.0 10/15/2018    CALCIUM 8.0 10/15/2018    GFRAA >60 10/15/2018    LABGLOM >60 10/15/2018    GLUCOSE 115 10/15/2018       IMPRESSION/RECOMMENDATIONS:      1- FRERDICK: Likely secondary to a pre-renal component although a non oliguric ATN cannot be ruled out. His urinary output is well maintained and his serum creatinine has normalized today  -continue current management and monitor for now. 2- Hyponatremia: Secondary to volume depletion   -improved with IV flduis. 3- HTN: Blood pressure within acceptable range.
Destinee Galvez is a 41 y.o. male patient.     Current Facility-Administered Medications   Medication Dose Route Frequency Provider Last Rate Last Dose    lactated ringers infusion   Intravenous Continuous Ashish Auguste MD 50 mL/hr at 10/17/18 1101      HYDROmorphone (DILAUDID) injection 0.5 mg  0.5 mg Intravenous Q4H PRN Harika Handler, APRN - CNP   0.5 mg at 10/16/18 2135    oxyCODONE (ROXICODONE) immediate release tablet 5 mg  5 mg Oral Q4H PRN Harika Handler, APRN - CNP   5 mg at 10/17/18 1755    chlordiazePOXIDE (LIBRIUM) capsule 25 mg  25 mg Oral Q8H Perfecto Kaplan MD   25 mg at 10/18/18 0104    prednisoLONE (PRELONE) 15 MG/5ML syrup 40 mg  40 mg Oral Daily Darlene Peters MD   40 mg at 10/17/18 1313    pantoprazole (PROTONIX) injection 40 mg  40 mg Intravenous Daily Abundio Yodit Hathaway MD   40 mg at 10/17/18 1019    And    sodium chloride (PF) 0.9 % injection 10 mL  10 mL Intravenous Daily Abundio Yodit Hathaway MD   10 mL at 10/16/18 0916    pneumococcal polyvalent (PNEUMOVAX 23) inj 0.5 mL  0.5 mL Intramuscular Once Abundio Yodit Hathaway MD        metoprolol succinate (TOPROL XL) extended release tablet 25 mg  25 mg Oral Daily Georgette Ashley MD   25 mg at 10/17/18 1018    magnesium hydroxide (MILK OF MAGNESIA) 400 MG/5ML suspension 30 mL  30 mL Oral Daily PRN Georgette Ashley MD        ondansetron [facility]) injection 4 mg  4 mg Intravenous Q6H PRN Georgette Ashley MD   4 mg at 10/14/18 0841    sodium chloride flush 0.9 % injection 10 mL  10 mL Intravenous 2 times per day Georgette Ashley MD   10 mL at 10/17/18 2014    sodium chloride flush 0.9 % injection 10 mL  10 mL Intravenous PRN Georgette Ashley MD   10 mL at 10/12/18 0405    enoxaparin (LOVENOX) injection 40 mg  40 mg Subcutaneous Daily Abundio Yodit Hathaway MD   40 mg at 10/15/18 0838     No Known Allergies  Active Problems:    Alcohol abuse    RUQ pain    Hyperbilirubinemia  Resolved Problems:    * No resolved hospital
Hetal Moore is a 41 y.o. male patient. Current Facility-Administered Medications   Medication Dose Route Frequency Provider Last Rate Last Dose    chlordiazePOXIDE (LIBRIUM) capsule 25 mg  25 mg Oral Q8H Roberta Woods MD   25 mg at 10/16/18 0916    prednisoLONE (PRELONE) 15 MG/5ML syrup 40 mg  40 mg Oral Daily Sharyn Maravilla MD   40 mg at 10/16/18 0916    0.9 % sodium chloride infusion   Intravenous Continuous Kymberly Villasenor MD 50 mL/hr at 10/15/18 2158      pantoprazole (PROTONIX) injection 40 mg  40 mg Intravenous Daily Abundio Gonzalez Chaney MD   40 mg at 10/16/18 0916    And    sodium chloride (PF) 0.9 % injection 10 mL  10 mL Intravenous Daily Abundio Gonzalez Chaney MD   10 mL at 10/16/18 0916    pneumococcal polyvalent (PNEUMOVAX 23) inj 0.5 mL  0.5 mL Intramuscular Once Abundio Gonzalez Chaney MD        metoprolol succinate (TOPROL XL) extended release tablet 25 mg  25 mg Oral Daily Rikki Rodriguez MD   25 mg at 10/16/18 0916    magnesium hydroxide (MILK OF MAGNESIA) 400 MG/5ML suspension 30 mL  30 mL Oral Daily PRN Rikki Rodriguez MD        ondansetron [facility]) injection 4 mg  4 mg Intravenous Q6H PRN Rikki Rodriguez MD   4 mg at 10/14/18 0841    sodium chloride flush 0.9 % injection 10 mL  10 mL Intravenous 2 times per day Rikki Rodriguez MD   10 mL at 10/15/18 2159    sodium chloride flush 0.9 % injection 10 mL  10 mL Intravenous PRN Rikki Rodriguez MD   10 mL at 10/12/18 0405    cefTRIAXone (ROCEPHIN) 1 g IVPB in 50 mL D5W minibag  1 g Intravenous Q24H Abundio Gonzalez Chaney MD   Stopped at 10/16/18 0340    enoxaparin (LOVENOX) injection 40 mg  40 mg Subcutaneous Daily Abundio Gonzalez Chaney MD   40 mg at 10/15/18 5478     No Known Allergies  Active Problems:    Alcohol abuse    RUQ pain    Hyperbilirubinemia  Resolved Problems:    * No resolved hospital problems. *    Blood pressure (!) 137/91, pulse 74, temperature 97.8 °F (36.6 °C), temperature source Oral, resp.
Hospitalist Progress Note      PCP: No primary care provider on file. Date of Admission: 10/10/2018    Chief Complaint: abd pain    Hospital Course:      Subjective:   Resting comfortably, eating well per wife       Medications:  Reviewed    Infusion Medications    sodium chloride 75 mL/hr at 10/13/18 1131     Scheduled Medications    prednisoLONE  40 mg Oral Daily    albumin human  25 g Intravenous Q8H    pantoprazole  40 mg Intravenous Daily    And    sodium chloride (PF)  10 mL Intravenous Daily    pneumococcal polyvalent  0.5 mL Intramuscular Once    influenza virus vaccine  0.5 mL Intramuscular Once    metoprolol succinate  25 mg Oral Daily    sodium chloride flush  10 mL Intravenous 2 times per day    chlordiazePOXIDE  25 mg Oral Q6H    cefTRIAXone (ROCEPHIN) IV  1 g Intravenous Q24H    enoxaparin  40 mg Subcutaneous Daily     PRN Meds: oxyCODONE, LORazepam, magnesium hydroxide, ondansetron, sodium chloride flush      Intake/Output Summary (Last 24 hours) at 10/13/18 1148  Last data filed at 10/13/18 0406   Gross per 24 hour   Intake             1830 ml   Output              700 ml   Net             1130 ml       Physical Exam Performed:    /76   Pulse 81   Temp 97.718 °F (36.5 °C)   Resp 16   Ht 5' 10\" (1.778 m)   Wt 250 lb (113.4 kg)   SpO2 94%   BMI 35.87 kg/m²     General appearance: No apparent distress, appears stated age and cooperative. HEENT: Pupils equal, round, and reactive to light. Conjunctivae/corneas clear. Neck: Supple, with full range of motion. No jugular venous distention. Trachea midline. Respiratory:  Normal respiratory effort. Clear to auscultation, bilaterally without Rales/Wheezes/Rhonchi. Cardiovascular: Regular rate and rhythm with normal S1/S2 without murmurs, rubs or gallops. Abdomen: Soft, non-tender, non-distended with normal bowel sounds. Musculoskeletal: No clubbing, cyanosis or edema bilaterally.   Full range of motion without
Hospitalist Progress Note      PCP: No primary care provider on file. Date of Admission: 10/10/2018    Chief Complaint: abd pain     Hospital Course:       Subjective:   Resting comfortably, sitting on bedside, notes diarrhea, more alert today, does not like drinking steroids      Medications:  Reviewed    Infusion Medications    sodium chloride 50 mL/hr at 10/14/18 2230     Scheduled Medications    chlordiazePOXIDE  25 mg Oral Q8H    prednisoLONE  40 mg Oral Daily    pantoprazole  40 mg Intravenous Daily    And    sodium chloride (PF)  10 mL Intravenous Daily    pneumococcal polyvalent  0.5 mL Intramuscular Once    influenza virus vaccine  0.5 mL Intramuscular Once    metoprolol succinate  25 mg Oral Daily    sodium chloride flush  10 mL Intravenous 2 times per day    cefTRIAXone (ROCEPHIN) IV  1 g Intravenous Q24H    enoxaparin  40 mg Subcutaneous Daily     PRN Meds: magnesium hydroxide, ondansetron, sodium chloride flush      Intake/Output Summary (Last 24 hours) at 10/15/18 1111  Last data filed at 10/15/18 0837   Gross per 24 hour   Intake             2002 ml   Output                0 ml   Net             2002 ml       Physical Exam Performed:    /71   Pulse 77   Temp 97.9 °F (36.6 °C) (Oral)   Resp 16   Ht 5' 10\" (1.778 m)   Wt 250 lb (113.4 kg)   SpO2 94%   BMI 35.87 kg/m²     General appearance: No apparent distress, appears stated age and cooperative. HEENT: Pupils equal, round, and reactive to light. Conjunctivae noted to be jaundiced/corneas clear. Neck: Supple, with full range of motion. No jugular venous distention. Trachea midline. Respiratory:  Normal respiratory effort. Clear to auscultation, bilaterally without Rales/Wheezes/Rhonchi. Cardiovascular: Regular rate and rhythm with normal S1/S2 without murmurs, rubs or gallops. Abdomen: Soft, non-tender, non-distended with normal bowel sounds. Musculoskeletal: No clubbing, cyanosis or edema bilaterally.   Full range
Hospitalist Progress Note      PCP: No primary care provider on file. Date of Admission: 10/10/2018  Hospital day - 1        Medications:  Reviewed    Infusion Medications   Scheduled Medications    pantoprazole  40 mg Intravenous Daily    And    sodium chloride (PF)  10 mL Intravenous Daily    metoprolol succinate  25 mg Oral Daily    sodium chloride flush  10 mL Intravenous 2 times per day    folic acid, thiamine, multi-vitamin with vitamin K infusion   Intravenous Daily    lactulose  20 g Oral TID    predniSONE  40 mg Oral Daily    chlordiazePOXIDE  25 mg Oral Q6H    cefTRIAXone (ROCEPHIN) IV  1 g Intravenous Q24H    enoxaparin  40 mg Subcutaneous Daily     PRN Meds: oxyCODONE, magnesium hydroxide, ondansetron, sodium chloride flush, LORazepam **OR** LORazepam **OR** LORazepam **OR** LORazepam **OR** LORazepam **OR** LORazepam **OR** LORazepam **OR** LORazepam      Intake/Output Summary (Last 24 hours) at 10/12/18 1134  Last data filed at 10/12/18 1051   Gross per 24 hour   Intake          1324.33 ml   Output              550 ml   Net           774.33 ml         Chief Complaint: abd pain         Subjective: 10.12- c/o of rt upper quad pain      ROS:  A 12 point review of symptoms is unremarkable with the exception of the chief complaint . Patient specifically denies cp  . Exam performed by me:    /82   Pulse 86   Temp 97.79 °F (36.6 °C)   Resp 20   Ht 5' 10\" (1.778 m)   Wt 250 lb (113.4 kg)   SpO2 94%   BMI 35.87 kg/m²       General appearance: comfortable, distress- none   HEENT: Atraumatic, Pupils equal, round, and reactive to light. Extra ocular muscles intact. Neck: Supple, with full range of motion. No jugular venous distention. Respiratory:  Clear to  auscultation , accessory muscle use no, Rales/Ronchi no  Cardiovascular: Regular rate and rhythm with normal S1/S2 ,  Abdomen: Soft, non-tender, non-distended with normal bowel sounds.   Musculoskelatal: No clubbing, no
Patient assessment complete and documented. Patient denies any needs at this time. Wife is at bedside. Bed in lowest locked position with call light within reach. Will continue to monitor.
Pt A&O. Assessment complete and charted. VSS. Pt is jaundice and complains of constant aching pain in abd. Analgesics admin as ordered. Wife at bedside. Pt denies any needs at this time and is resting in bed. Bed locked and in lowest position. Call light and bedside table within reach. Will continue to monitor.
Pt resting in bed. Call light within reach and seizure pads are on bed. No complaints at this time and will continue to monitor. Chloe
Report called to Elana.  Family to bedside updated per md.
0.4 10/14/2018    MONOSABS 1.1 10/14/2018    LYMPHSABS 0.5 10/14/2018    EOSABS 0.0 10/14/2018    BASOSABS 0.0 10/14/2018     CMP:    Lab Results   Component Value Date     10/14/2018    K 3.8 10/14/2018    K 3.7 10/13/2018     10/14/2018    CO2 22 10/14/2018    BUN 20 10/14/2018    CREATININE 1.4 10/14/2018    GFRAA >60 10/14/2018    AGRATIO 0.8 10/14/2018    LABGLOM 55 10/14/2018    GLUCOSE 126 10/14/2018    PROT 6.2 10/14/2018    LABALBU 2.8 10/14/2018    CALCIUM 8.0 10/14/2018    BILITOT 18.2 10/14/2018    ALKPHOS 206 10/14/2018    AST 95 10/14/2018    ALT 35 10/14/2018       ASSESSMENT AND PLAN  39yo M admitted with ETOH related hepatitis. Signs of encephalopathy are not present at this time. Acute kidney injury has manifest which is associated with increased mortality. Creatinine is improving. LFTs are stable though the degree of biosynthetic dysfunction is concerning. Prednisolone was started yesterday. LFTs and INR will be followed daily. As he has a UTI the infection will need to be monitored closely for signs of exacerbation due to immunosuppression. He has not manifest fevers nor worsening leukocytosis. Protonix will continue for stress ulcer prophylaxis. Librax should be tapered as this may be affecting MS. I think it unlikely that encephalopathy is involved at this time.
he was admitted to ICU for acute alcohol hepatitis,MDF 75, was getting p.o. Prednisone(switched to prednisolone)     Impending alcohol withdrawal required Ciwa and p.o. Librium,   Banana bag.     FREDRICK- ongoing, suspected prerenal, IVFs given  nephro consulted and managing, apprec recs   renal u/s was wnl, noted abd ascites     Hep A total AB + , not very specific      Acute hepatic encephalopathy requiring lactulose, ammonia levels 171.   Ultrasonogram of abdomen showed diffuse hepatic steatosis, GI consulted.     Complicated urinary tract infection, Rocephin for now.  Continued steroids though he has urinary tract infection given the severity of presentation  Follow up urine culture noted NG at 36hrs    DVT Prophylaxis: lovenox  Diet: DIET GENERAL;  Diet NPO, After Midnight  Code Status: Full Code    PT/OT Eval Status: ambulatory    Dispo - pending EGD, check abd xray, get therp thoracentesis    Ofe Gonzales MD

## 2018-10-19 ENCOUNTER — HOSPITAL ENCOUNTER (OUTPATIENT)
Age: 48
Discharge: HOME | End: 2018-10-19
Payer: COMMERCIAL

## 2018-10-19 LAB
ALBUMIN SERPL-MCNC: 2.8 G/DL (ref 3.4–5)
ALP BLD-CCNC: 193 U/L (ref 40–129)
ALT SERPL-CCNC: 87 U/L (ref 10–40)
AST SERPL-CCNC: 157 U/L (ref 15–37)
BILIRUB SERPL-MCNC: 19.6 MG/DL (ref 0–1)
BILIRUBIN DIRECT: >10 MG/DL (ref 0–0.3)
BILIRUBIN, INDIRECT: ABNORMAL MG/DL (ref 0–1)
TOTAL PROTEIN: 6.7 G/DL (ref 6.4–8.2)

## 2018-10-19 PROCEDURE — 80076 HEPATIC FUNCTION PANEL: CPT

## 2018-10-19 PROCEDURE — 36415 COLL VENOUS BLD VENIPUNCTURE: CPT

## 2018-10-21 ENCOUNTER — HOSPITAL ENCOUNTER (EMERGENCY)
Age: 48
Discharge: HOME | End: 2018-10-22
Payer: COMMERCIAL

## 2018-10-21 DIAGNOSIS — R14.0 ABDOMINAL DISTENTION: Primary | ICD-10-CM

## 2018-10-21 DIAGNOSIS — R10.84 GENERALIZED ABDOMINAL PAIN: ICD-10-CM

## 2018-10-21 DIAGNOSIS — R17 ICTERUS: ICD-10-CM

## 2018-10-21 DIAGNOSIS — R18.8 ASCITES OF LIVER: ICD-10-CM

## 2018-10-21 DIAGNOSIS — R74.8 ELEVATED LIVER ENZYMES: ICD-10-CM

## 2018-10-21 DIAGNOSIS — R11.0 NAUSEA WITHOUT VOMITING: ICD-10-CM

## 2018-10-21 LAB
A/G RATIO: 0.8 (ref 1.1–2.2)
ALBUMIN SERPL-MCNC: 2.7 G/DL (ref 3.4–5)
ALP BLD-CCNC: 165 U/L (ref 40–129)
ALT SERPL-CCNC: 112 U/L (ref 10–40)
ANION GAP SERPL CALCULATED.3IONS-SCNC: 12 MMOL/L (ref 3–16)
ANISOCYTOSIS: ABNORMAL
AST SERPL-CCNC: 152 U/L (ref 15–37)
BANDED NEUTROPHILS RELATIVE PERCENT: 11 % (ref 0–7)
BASOPHILS ABSOLUTE: 0 K/UL (ref 0–0.2)
BASOPHILS RELATIVE PERCENT: 0 %
BILIRUB SERPL-MCNC: 19.7 MG/DL (ref 0–1)
BUN BLDV-MCNC: 17 MG/DL (ref 7–20)
CALCIUM SERPL-MCNC: 8.7 MG/DL (ref 8.3–10.6)
CHLORIDE BLD-SCNC: 97 MMOL/L (ref 99–110)
CO2: 24 MMOL/L (ref 21–32)
CREAT SERPL-MCNC: <0.5 MG/DL (ref 0.9–1.3)
EOSINOPHILS ABSOLUTE: 0 K/UL (ref 0–0.6)
EOSINOPHILS RELATIVE PERCENT: 0 %
GFR AFRICAN AMERICAN: >60
GFR NON-AFRICAN AMERICAN: >60
GLOBULIN: 3.6 G/DL
GLUCOSE BLD-MCNC: 160 MG/DL (ref 70–99)
HCT VFR BLD CALC: 42.7 % (ref 40.5–52.5)
HEMATOLOGY PATH CONSULT: NO
HEMOGLOBIN: 14.5 G/DL (ref 13.5–17.5)
INR BLD: 1.79 (ref 0.86–1.14)
LIPASE: 111 U/L (ref 13–60)
LYMPHOCYTES ABSOLUTE: 0.3 K/UL (ref 1–5.1)
LYMPHOCYTES RELATIVE PERCENT: 2 %
MCH RBC QN AUTO: 33.5 PG (ref 26–34)
MCHC RBC AUTO-ENTMCNC: 33.9 G/DL (ref 31–36)
MCV RBC AUTO: 98.6 FL (ref 80–100)
MONOCYTES ABSOLUTE: 0.4 K/UL (ref 0–1.3)
MONOCYTES RELATIVE PERCENT: 3 %
NEUTROPHILS ABSOLUTE: 12.6 K/UL (ref 1.7–7.7)
NEUTROPHILS RELATIVE PERCENT: 84 %
PDW BLD-RTO: 18.3 % (ref 12.4–15.4)
PLATELET # BLD: 95 K/UL (ref 135–450)
PLATELET SLIDE REVIEW: ABNORMAL
PMV BLD AUTO: 9.4 FL (ref 5–10.5)
POIKILOCYTES: ABNORMAL
POLYCHROMASIA: ABNORMAL
POTASSIUM SERPL-SCNC: 5.1 MMOL/L (ref 3.5–5.1)
PROTHROMBIN TIME: 20.4 SEC (ref 9.8–13)
RBC # BLD: 4.33 M/UL (ref 4.2–5.9)
SODIUM BLD-SCNC: 133 MMOL/L (ref 136–145)
TARGET CELLS: ABNORMAL
TOTAL PROTEIN: 6.3 G/DL (ref 6.4–8.2)
TOXIC GRANULATION: PRESENT
WBC # BLD: 13.3 K/UL (ref 4–11)

## 2018-10-21 PROCEDURE — 6370000000 HC RX 637 (ALT 250 FOR IP): Performed by: NURSE PRACTITIONER

## 2018-10-21 PROCEDURE — 80053 COMPREHEN METABOLIC PANEL: CPT

## 2018-10-21 PROCEDURE — 96361 HYDRATE IV INFUSION ADD-ON: CPT

## 2018-10-21 PROCEDURE — 99283 EMERGENCY DEPT VISIT LOW MDM: CPT

## 2018-10-21 PROCEDURE — 6360000002 HC RX W HCPCS: Performed by: NURSE PRACTITIONER

## 2018-10-21 PROCEDURE — 96374 THER/PROPH/DIAG INJ IV PUSH: CPT

## 2018-10-21 PROCEDURE — 2580000003 HC RX 258: Performed by: NURSE PRACTITIONER

## 2018-10-21 PROCEDURE — 85025 COMPLETE CBC W/AUTO DIFF WBC: CPT

## 2018-10-21 PROCEDURE — 96375 TX/PRO/DX INJ NEW DRUG ADDON: CPT

## 2018-10-21 PROCEDURE — 83690 ASSAY OF LIPASE: CPT

## 2018-10-21 PROCEDURE — 85610 PROTHROMBIN TIME: CPT

## 2018-10-21 PROCEDURE — 36415 COLL VENOUS BLD VENIPUNCTURE: CPT

## 2018-10-21 RX ORDER — MORPHINE SULFATE 4 MG/ML
4 INJECTION, SOLUTION INTRAMUSCULAR; INTRAVENOUS ONCE
Status: DISCONTINUED | OUTPATIENT
Start: 2018-10-21 | End: 2018-10-21

## 2018-10-21 RX ORDER — METHYLPHENIDATE HYDROCHLORIDE 18 MG/1
TABLET, EXTENDED RELEASE ORAL
Refills: 0 | COMMUNITY
Start: 2018-07-17 | End: 2018-10-21 | Stop reason: ALTCHOICE

## 2018-10-21 RX ORDER — SPIRONOLACTONE 25 MG/1
25 TABLET ORAL ONCE
Status: COMPLETED | OUTPATIENT
Start: 2018-10-21 | End: 2018-10-21

## 2018-10-21 RX ORDER — ONDANSETRON 2 MG/ML
4 INJECTION INTRAMUSCULAR; INTRAVENOUS ONCE
Status: COMPLETED | OUTPATIENT
Start: 2018-10-21 | End: 2018-10-21

## 2018-10-21 RX ORDER — FUROSEMIDE 10 MG/ML
40 INJECTION INTRAMUSCULAR; INTRAVENOUS ONCE
Status: COMPLETED | OUTPATIENT
Start: 2018-10-21 | End: 2018-10-21

## 2018-10-21 RX ADMIN — SODIUM CHLORIDE 1000 ML: 9 INJECTION, SOLUTION INTRAVENOUS at 21:02

## 2018-10-21 RX ADMIN — HYDROMORPHONE HYDROCHLORIDE 1 MG: 1 INJECTION, SOLUTION INTRAMUSCULAR; INTRAVENOUS; SUBCUTANEOUS at 21:02

## 2018-10-21 RX ADMIN — HYDROMORPHONE HYDROCHLORIDE 1 MG: 1 INJECTION, SOLUTION INTRAMUSCULAR; INTRAVENOUS; SUBCUTANEOUS at 23:26

## 2018-10-21 RX ADMIN — FUROSEMIDE 40 MG: 10 INJECTION, SOLUTION INTRAMUSCULAR; INTRAVENOUS at 23:26

## 2018-10-21 RX ADMIN — ONDANSETRON 4 MG: 2 INJECTION, SOLUTION INTRAMUSCULAR; INTRAVENOUS at 21:02

## 2018-10-21 RX ADMIN — SPIRONOLACTONE 25 MG: 25 TABLET ORAL at 23:26

## 2018-10-21 ASSESSMENT — PAIN SCALES - GENERAL
PAINLEVEL_OUTOF10: 7
PAINLEVEL_OUTOF10: 10
PAINLEVEL_OUTOF10: 3

## 2018-10-22 VITALS
HEART RATE: 92 BPM | SYSTOLIC BLOOD PRESSURE: 137 MMHG | HEIGHT: 70 IN | RESPIRATION RATE: 16 BRPM | BODY MASS INDEX: 31.5 KG/M2 | DIASTOLIC BLOOD PRESSURE: 88 MMHG | TEMPERATURE: 97.3 F | OXYGEN SATURATION: 97 % | WEIGHT: 220 LBS

## 2018-10-22 RX ORDER — FUROSEMIDE 40 MG/1
40 TABLET ORAL DAILY
Qty: 10 TABLET | Refills: 0 | Status: ON HOLD | OUTPATIENT
Start: 2018-10-22 | End: 2018-11-20 | Stop reason: HOSPADM

## 2018-10-22 RX ORDER — SPIRONOLACTONE 25 MG/1
25 TABLET ORAL DAILY
Qty: 10 TABLET | Refills: 0 | Status: ON HOLD | OUTPATIENT
Start: 2018-10-22 | End: 2018-11-20 | Stop reason: HOSPADM

## 2018-10-22 RX ORDER — OXYCODONE HYDROCHLORIDE AND ACETAMINOPHEN 5; 325 MG/1; MG/1
1 TABLET ORAL EVERY 6 HOURS PRN
Qty: 15 TABLET | Refills: 0 | Status: SHIPPED | OUTPATIENT
Start: 2018-10-22 | End: 2018-10-29

## 2018-10-22 ASSESSMENT — ENCOUNTER SYMPTOMS
ABDOMINAL PAIN: 1
VOMITING: 0
BACK PAIN: 0
DIARRHEA: 0
NAUSEA: 0
ABDOMINAL DISTENTION: 1
COLOR CHANGE: 0
COUGH: 0
SHORTNESS OF BREATH: 0
WHEEZING: 0

## 2018-10-22 ASSESSMENT — PAIN SCALES - GENERAL: PAINLEVEL_OUTOF10: 6

## 2018-10-22 NOTE — ED PROVIDER NOTES
well.  I evaluated the patient. The physician was available for consultation as needed. The patient and / or the family were informed of the results of anytests, a time was given to answer questions, a plan was proposed and they agreed with plan. The patient verbalized understanding of discharge instructions and was discharged from the department in stable condition. CLINICAL IMPRESSION:  1. Abdominal distention    2. Ascites of liver    3. Nausea without vomiting    4. Generalized abdominal pain    5. Icterus    6.  Elevated liver enzymes        DISPOSITION        PATIENT REFERRED TO:  Leta Dave MD  [address] [place] [address], [address] [place] [address]  [phone]    Schedule an appointment as soon as possible for a visit in 1 day  Call GI first thing in the morning to set up paracentesis    [facility]  ED  [address] [facility]  Go to   If symptoms worsen      DISCHARGE MEDICATIONS:  Discharge Medication List as of 10/22/2018 12:23 AM      START taking these medications    Details   furosemide (LASIX) 40 MG tablet Take 1 tablet by mouth daily for 10 doses, Disp-10 tablet, R-0Print      spironolactone (ALDACTONE) 25 MG tablet Take 1 tablet by mouth daily for 10 days, Disp-10 tablet, R-0Print             DISCONTINUED MEDICATIONS:  Discharge Medication List as of 10/22/2018 12:23 AM                 (Please note the MDM and HPI sections of this note were completed with a voice recognition program.  Efforts weremade to edit the dictations but occasionally words are mis-transcribed.)    Electronically signed, FLORIAN Brito CNP,         FLORIAN Brito CNP  10/22/18 0140

## 2018-10-24 ENCOUNTER — HOSPITAL ENCOUNTER (OUTPATIENT)
Dept: ULTRASOUND IMAGING | Age: 48
Discharge: HOME | End: 2018-10-24
Payer: COMMERCIAL

## 2018-10-24 DIAGNOSIS — R18.8 OTHER ASCITES: ICD-10-CM

## 2018-10-24 PROCEDURE — 49083 ABD PARACENTESIS W/IMAGING: CPT

## 2018-11-12 ENCOUNTER — HOSPITAL ENCOUNTER (OUTPATIENT)
Age: 48
Discharge: HOME | DRG: 432 | End: 2018-11-12
Payer: COMMERCIAL

## 2018-11-12 LAB
ANION GAP SERPL CALCULATED.3IONS-SCNC: 15 MMOL/L (ref 3–16)
BUN BLDV-MCNC: 59 MG/DL (ref 7–20)
CALCIUM SERPL-MCNC: 9.2 MG/DL (ref 8.3–10.6)
CHLORIDE BLD-SCNC: 90 MMOL/L (ref 99–110)
CO2: 18 MMOL/L (ref 21–32)
CREAT SERPL-MCNC: 1.9 MG/DL (ref 0.9–1.3)
GFR AFRICAN AMERICAN: 46
GFR NON-AFRICAN AMERICAN: 38
GLUCOSE BLD-MCNC: 119 MG/DL (ref 70–99)
POTASSIUM SERPL-SCNC: 5.7 MMOL/L (ref 3.5–5.1)
SODIUM BLD-SCNC: 123 MMOL/L (ref 136–145)

## 2018-11-12 PROCEDURE — 36415 COLL VENOUS BLD VENIPUNCTURE: CPT

## 2018-11-12 PROCEDURE — 80048 BASIC METABOLIC PNL TOTAL CA: CPT

## 2018-11-13 ENCOUNTER — APPOINTMENT (OUTPATIENT)
Dept: CT IMAGING | Age: 48
DRG: 432 | End: 2018-11-13
Payer: COMMERCIAL

## 2018-11-13 ENCOUNTER — APPOINTMENT (OUTPATIENT)
Dept: GENERAL RADIOLOGY | Age: 48
DRG: 432 | End: 2018-11-13
Payer: COMMERCIAL

## 2018-11-13 ENCOUNTER — HOSPITAL ENCOUNTER (INPATIENT)
Age: 48
LOS: 7 days | Discharge: HOME | DRG: 432 | End: 2018-11-20
Attending: EMERGENCY MEDICINE | Admitting: PEDIATRICS
Payer: COMMERCIAL

## 2018-11-13 DIAGNOSIS — R18.8 CIRRHOSIS OF LIVER WITH ASCITES, UNSPECIFIED HEPATIC CIRRHOSIS TYPE (HCC): Primary | ICD-10-CM

## 2018-11-13 DIAGNOSIS — K74.60 CIRRHOSIS OF LIVER WITH ASCITES, UNSPECIFIED HEPATIC CIRRHOSIS TYPE (HCC): Primary | ICD-10-CM

## 2018-11-13 PROBLEM — E87.1 HYPONATREMIA: Status: ACTIVE | Noted: 2018-11-13

## 2018-11-13 LAB
A/G RATIO: 0.7 (ref 1.1–2.2)
ALBUMIN SERPL-MCNC: 2.5 G/DL (ref 3.4–5)
ALP BLD-CCNC: 202 U/L (ref 40–129)
ALT SERPL-CCNC: 92 U/L (ref 10–40)
AMMONIA: 71 UMOL/L (ref 16–60)
ANION GAP SERPL CALCULATED.3IONS-SCNC: 11 MMOL/L (ref 3–16)
ANISOCYTOSIS: ABNORMAL
AST SERPL-CCNC: 99 U/L (ref 15–37)
BANDED NEUTROPHILS RELATIVE PERCENT: 9 % (ref 0–7)
BASOPHILS ABSOLUTE: 0 K/UL (ref 0–0.2)
BASOPHILS RELATIVE PERCENT: 0 %
BILIRUB SERPL-MCNC: 19.4 MG/DL (ref 0–1)
BUN BLDV-MCNC: 56 MG/DL (ref 7–20)
CALCIUM SERPL-MCNC: 8.7 MG/DL (ref 8.3–10.6)
CHLORIDE BLD-SCNC: 86 MMOL/L (ref 99–110)
CO2: 21 MMOL/L (ref 21–32)
CREAT SERPL-MCNC: 1.2 MG/DL (ref 0.9–1.3)
EOSINOPHILS ABSOLUTE: 0 K/UL (ref 0–0.6)
EOSINOPHILS RELATIVE PERCENT: 0 %
GFR AFRICAN AMERICAN: >60
GFR NON-AFRICAN AMERICAN: >60
GLOBULIN: 3.7 G/DL
GLUCOSE BLD-MCNC: 216 MG/DL (ref 70–99)
HCT VFR BLD CALC: 41.5 % (ref 40.5–52.5)
HEMOGLOBIN: 14.1 G/DL (ref 13.5–17.5)
INR BLD: 1.49 (ref 0.86–1.14)
LYMPHOCYTES ABSOLUTE: 0.5 K/UL (ref 1–5.1)
LYMPHOCYTES RELATIVE PERCENT: 4 %
MCH RBC QN AUTO: 34.2 PG (ref 26–34)
MCHC RBC AUTO-ENTMCNC: 34.1 G/DL (ref 31–36)
MCV RBC AUTO: 100.5 FL (ref 80–100)
MONOCYTES ABSOLUTE: 0.9 K/UL (ref 0–1.3)
MONOCYTES RELATIVE PERCENT: 8 %
NEUTROPHILS ABSOLUTE: 10 K/UL (ref 1.7–7.7)
NEUTROPHILS RELATIVE PERCENT: 79 %
PDW BLD-RTO: 16.2 % (ref 12.4–15.4)
PLATELET # BLD: 144 K/UL (ref 135–450)
PLATELET SLIDE REVIEW: ADEQUATE
PMV BLD AUTO: 7.9 FL (ref 5–10.5)
POIKILOCYTES: ABNORMAL
POLYCHROMASIA: ABNORMAL
POTASSIUM REFLEX MAGNESIUM: 5.4 MMOL/L (ref 3.5–5.1)
PROTHROMBIN TIME: 17 SEC (ref 9.8–13)
RBC # BLD: 4.13 M/UL (ref 4.2–5.9)
SODIUM BLD-SCNC: 118 MMOL/L (ref 136–145)
TARGET CELLS: ABNORMAL
TOTAL CK: 55 U/L (ref 39–308)
TOTAL PROTEIN: 6.2 G/DL (ref 6.4–8.2)
WBC # BLD: 11.4 K/UL (ref 4–11)

## 2018-11-13 PROCEDURE — 96361 HYDRATE IV INFUSION ADD-ON: CPT

## 2018-11-13 PROCEDURE — 2580000003 HC RX 258: Performed by: EMERGENCY MEDICINE

## 2018-11-13 PROCEDURE — 85025 COMPLETE CBC W/AUTO DIFF WBC: CPT

## 2018-11-13 PROCEDURE — 85610 PROTHROMBIN TIME: CPT

## 2018-11-13 PROCEDURE — 1200000000 HC SEMI PRIVATE

## 2018-11-13 PROCEDURE — 71045 X-RAY EXAM CHEST 1 VIEW: CPT

## 2018-11-13 PROCEDURE — 82550 ASSAY OF CK (CPK): CPT

## 2018-11-13 PROCEDURE — 36415 COLL VENOUS BLD VENIPUNCTURE: CPT

## 2018-11-13 PROCEDURE — 96375 TX/PRO/DX INJ NEW DRUG ADDON: CPT

## 2018-11-13 PROCEDURE — 6360000002 HC RX W HCPCS: Performed by: PHYSICIAN ASSISTANT

## 2018-11-13 PROCEDURE — 99285 EMERGENCY DEPT VISIT HI MDM: CPT

## 2018-11-13 PROCEDURE — 80053 COMPREHEN METABOLIC PANEL: CPT

## 2018-11-13 PROCEDURE — 2580000003 HC RX 258: Performed by: PHYSICIAN ASSISTANT

## 2018-11-13 PROCEDURE — 96374 THER/PROPH/DIAG INJ IV PUSH: CPT

## 2018-11-13 PROCEDURE — 93005 ELECTROCARDIOGRAM TRACING: CPT | Performed by: PHYSICIAN ASSISTANT

## 2018-11-13 PROCEDURE — 74176 CT ABD & PELVIS W/O CONTRAST: CPT

## 2018-11-13 PROCEDURE — 82140 ASSAY OF AMMONIA: CPT

## 2018-11-13 RX ORDER — OXYCODONE HYDROCHLORIDE 5 MG/1
5 TABLET ORAL
Status: COMPLETED | OUTPATIENT
Start: 2018-11-13 | End: 2018-11-14

## 2018-11-13 RX ORDER — SODIUM CHLORIDE 9 MG/ML
1000 INJECTION, SOLUTION INTRAVENOUS CONTINUOUS
Status: DISCONTINUED | OUTPATIENT
Start: 2018-11-13 | End: 2018-11-14

## 2018-11-13 RX ORDER — SODIUM CHLORIDE 0.9 % (FLUSH) 0.9 %
10 SYRINGE (ML) INJECTION PRN
Status: DISCONTINUED | OUTPATIENT
Start: 2018-11-13 | End: 2018-11-13 | Stop reason: SDUPTHER

## 2018-11-13 RX ORDER — LORAZEPAM 1 MG/1
4 TABLET ORAL
Status: DISCONTINUED | OUTPATIENT
Start: 2018-11-13 | End: 2018-11-20 | Stop reason: HOSPADM

## 2018-11-13 RX ORDER — LORAZEPAM 1 MG/1
2 TABLET ORAL
Status: DISCONTINUED | OUTPATIENT
Start: 2018-11-13 | End: 2018-11-20 | Stop reason: HOSPADM

## 2018-11-13 RX ORDER — THIAMINE MONONITRATE (VIT B1) 100 MG
100 TABLET ORAL DAILY
Status: DISCONTINUED | OUTPATIENT
Start: 2018-11-14 | End: 2018-11-20 | Stop reason: HOSPADM

## 2018-11-13 RX ORDER — LORAZEPAM 2 MG/ML
2 INJECTION INTRAMUSCULAR
Status: DISCONTINUED | OUTPATIENT
Start: 2018-11-13 | End: 2018-11-20 | Stop reason: HOSPADM

## 2018-11-13 RX ORDER — SODIUM CHLORIDE 0.9 % (FLUSH) 0.9 %
10 SYRINGE (ML) INJECTION EVERY 12 HOURS SCHEDULED
Status: DISCONTINUED | OUTPATIENT
Start: 2018-11-13 | End: 2018-11-13 | Stop reason: SDUPTHER

## 2018-11-13 RX ORDER — SODIUM CHLORIDE 0.9 % (FLUSH) 0.9 %
10 SYRINGE (ML) INJECTION EVERY 12 HOURS SCHEDULED
Status: DISCONTINUED | OUTPATIENT
Start: 2018-11-13 | End: 2018-11-20 | Stop reason: HOSPADM

## 2018-11-13 RX ORDER — LORAZEPAM 2 MG/ML
3 INJECTION INTRAMUSCULAR
Status: DISCONTINUED | OUTPATIENT
Start: 2018-11-13 | End: 2018-11-20 | Stop reason: HOSPADM

## 2018-11-13 RX ORDER — ONDANSETRON 2 MG/ML
4 INJECTION INTRAMUSCULAR; INTRAVENOUS ONCE
Status: COMPLETED | OUTPATIENT
Start: 2018-11-13 | End: 2018-11-13

## 2018-11-13 RX ORDER — LORAZEPAM 2 MG/ML
1 INJECTION INTRAMUSCULAR
Status: DISCONTINUED | OUTPATIENT
Start: 2018-11-13 | End: 2018-11-20 | Stop reason: HOSPADM

## 2018-11-13 RX ORDER — LORAZEPAM 2 MG/ML
4 INJECTION INTRAMUSCULAR
Status: DISCONTINUED | OUTPATIENT
Start: 2018-11-13 | End: 2018-11-20 | Stop reason: HOSPADM

## 2018-11-13 RX ORDER — 0.9 % SODIUM CHLORIDE 0.9 %
500 INTRAVENOUS SOLUTION INTRAVENOUS ONCE
Status: COMPLETED | OUTPATIENT
Start: 2018-11-13 | End: 2018-11-13

## 2018-11-13 RX ORDER — ONDANSETRON 2 MG/ML
4 INJECTION INTRAMUSCULAR; INTRAVENOUS EVERY 6 HOURS PRN
Status: DISCONTINUED | OUTPATIENT
Start: 2018-11-13 | End: 2018-11-20 | Stop reason: HOSPADM

## 2018-11-13 RX ORDER — LORAZEPAM 1 MG/1
3 TABLET ORAL
Status: DISCONTINUED | OUTPATIENT
Start: 2018-11-13 | End: 2018-11-20 | Stop reason: HOSPADM

## 2018-11-13 RX ORDER — METOPROLOL SUCCINATE 25 MG/1
25 TABLET, EXTENDED RELEASE ORAL DAILY
Status: DISCONTINUED | OUTPATIENT
Start: 2018-11-14 | End: 2018-11-16

## 2018-11-13 RX ORDER — FOLIC ACID 1 MG/1
1 TABLET ORAL DAILY
Status: DISCONTINUED | OUTPATIENT
Start: 2018-11-14 | End: 2018-11-20 | Stop reason: HOSPADM

## 2018-11-13 RX ORDER — LISINOPRIL 20 MG/1
20 TABLET ORAL DAILY
Status: DISCONTINUED | OUTPATIENT
Start: 2018-11-14 | End: 2018-11-15

## 2018-11-13 RX ORDER — SPIRONOLACTONE 25 MG/1
25 TABLET ORAL DAILY
Status: DISCONTINUED | OUTPATIENT
Start: 2018-11-14 | End: 2018-11-14

## 2018-11-13 RX ORDER — THIAMINE MONONITRATE (VIT B1) 100 MG
100 TABLET ORAL DAILY
Status: DISCONTINUED | OUTPATIENT
Start: 2018-11-14 | End: 2018-11-13 | Stop reason: SDUPTHER

## 2018-11-13 RX ORDER — PANTOPRAZOLE SODIUM 40 MG/1
40 TABLET, DELAYED RELEASE ORAL DAILY
Status: DISCONTINUED | OUTPATIENT
Start: 2018-11-14 | End: 2018-11-20 | Stop reason: HOSPADM

## 2018-11-13 RX ORDER — LORAZEPAM 1 MG/1
1 TABLET ORAL
Status: DISCONTINUED | OUTPATIENT
Start: 2018-11-13 | End: 2018-11-20 | Stop reason: HOSPADM

## 2018-11-13 RX ORDER — SODIUM CHLORIDE 0.9 % (FLUSH) 0.9 %
10 SYRINGE (ML) INJECTION PRN
Status: DISCONTINUED | OUTPATIENT
Start: 2018-11-13 | End: 2018-11-20 | Stop reason: HOSPADM

## 2018-11-13 RX ADMIN — SODIUM CHLORIDE 500 ML: 9 INJECTION, SOLUTION INTRAVENOUS at 19:37

## 2018-11-13 RX ADMIN — SODIUM CHLORIDE 1000 ML: 9 INJECTION, SOLUTION INTRAVENOUS at 21:58

## 2018-11-13 RX ADMIN — ONDANSETRON 4 MG: 2 INJECTION INTRAMUSCULAR; INTRAVENOUS at 19:36

## 2018-11-13 RX ADMIN — HYDROMORPHONE HYDROCHLORIDE 0.5 MG: 1 INJECTION, SOLUTION INTRAMUSCULAR; INTRAVENOUS; SUBCUTANEOUS at 19:37

## 2018-11-13 ASSESSMENT — PAIN DESCRIPTION - ONSET: ONSET: ON-GOING

## 2018-11-13 ASSESSMENT — PAIN DESCRIPTION - FREQUENCY: FREQUENCY: CONTINUOUS

## 2018-11-13 ASSESSMENT — PAIN DESCRIPTION - DESCRIPTORS: DESCRIPTORS: ACHING

## 2018-11-13 ASSESSMENT — PAIN SCALES - GENERAL
PAINLEVEL_OUTOF10: 5
PAINLEVEL_OUTOF10: 5
PAINLEVEL_OUTOF10: 7

## 2018-11-13 ASSESSMENT — PAIN DESCRIPTION - LOCATION: LOCATION: ABDOMEN

## 2018-11-13 ASSESSMENT — PAIN DESCRIPTION - PAIN TYPE: TYPE: CHRONIC PAIN

## 2018-11-13 ASSESSMENT — PAIN DESCRIPTION - PROGRESSION: CLINICAL_PROGRESSION: NOT CHANGED

## 2018-11-13 NOTE — ED PROVIDER NOTES
abdominal ultrasound HISTORY: ORDERING SYSTEM PROVIDED HISTORY: History of hepatitis A as well as alcoholic hepatitis status post previous paracentesis and ascites now with recurrence of abdominal distention and jaundice and PCP report from outpatient setting of acute kidney injury please evaluate for acute process TECHNOLOGIST PROVIDED HISTORY: Additional Contrast?->None Ordering Physician Provided Reason for Exam: hx of hep A,abdominal distention and jaundice Acuity: Acute Type of Exam: Initial History of hypertension, hypercholesterolemia FINDINGS: Lower Chest: There is a small left pleural effusion with minimal mostly dependent left lower lobe airspace disease, likely atelectasis. Organs: The morphology liver is cirrhotic with nodular contour is well as fissural widening. The gallbladder surgically absent. The spleen is upper limits of normal in size. Noncontrast images of the adrenal glands, pancreas, and kidneys show no acute abnormality. GI/Bowel: The bowel shows normal caliber and course without suspected wall thickening normal appendix. Pelvis: The prostate gland and urinary bladder are within normal limits. Vasculature: The aorta is normal in caliber and course, showing mild calcifications. There is recanalization of the umbilical vein is well as perisplenic, retroperitoneal and mesenteric venous collaterals. Bones/Soft Tissues: No acute bony abnormality. There is large amount of abdominopelvic ascites as well as mesenteric edema. No focal fluid collection or extraluminal gas. No pathologically enlarged abdominopelvic lymph nodes. Cirrhotic morphology liver with evidence of portal venous hypertension including venous collaterals, mesenteric edema and large amount of abdominopelvic ascites. Small left pleural effusion with minimal dependent left lower lobe airspace disease, likely atelectasis.      Xr Chest Portable    Result Date: 11/13/2018  EXAMINATION: SINGLE XRAY VIEW OF THE CHEST 11/13/2018

## 2018-11-14 LAB
AMMONIA: 255 UMOL/L (ref 16–60)
ESTIMATED AVERAGE GLUCOSE: 93.9 MG/DL
HBA1C MFR BLD: 4.9 %

## 2018-11-14 PROCEDURE — 6370000000 HC RX 637 (ALT 250 FOR IP): Performed by: PEDIATRICS

## 2018-11-14 PROCEDURE — 6360000002 HC RX W HCPCS: Performed by: NURSE PRACTITIONER

## 2018-11-14 PROCEDURE — 1200000000 HC SEMI PRIVATE

## 2018-11-14 PROCEDURE — 93010 ELECTROCARDIOGRAM REPORT: CPT | Performed by: INTERNAL MEDICINE

## 2018-11-14 PROCEDURE — 6370000000 HC RX 637 (ALT 250 FOR IP): Performed by: INTERNAL MEDICINE

## 2018-11-14 PROCEDURE — 2580000003 HC RX 258: Performed by: EMERGENCY MEDICINE

## 2018-11-14 PROCEDURE — 6360000002 HC RX W HCPCS: Performed by: INTERNAL MEDICINE

## 2018-11-14 PROCEDURE — 82140 ASSAY OF AMMONIA: CPT

## 2018-11-14 PROCEDURE — 83036 HEMOGLOBIN GLYCOSYLATED A1C: CPT

## 2018-11-14 PROCEDURE — P9047 ALBUMIN (HUMAN), 25%, 50ML: HCPCS | Performed by: INTERNAL MEDICINE

## 2018-11-14 PROCEDURE — 2580000003 HC RX 258: Performed by: PEDIATRICS

## 2018-11-14 PROCEDURE — 36415 COLL VENOUS BLD VENIPUNCTURE: CPT

## 2018-11-14 RX ORDER — PREDNISOLONE 15 MG/5ML
5 SOLUTION ORAL DAILY
Status: DISCONTINUED | OUTPATIENT
Start: 2018-11-19 | End: 2018-11-20 | Stop reason: HOSPADM

## 2018-11-14 RX ORDER — LACTULOSE 10 G/15ML
30 SOLUTION ORAL EVERY 6 HOURS SCHEDULED
Status: DISCONTINUED | OUTPATIENT
Start: 2018-11-14 | End: 2018-11-18

## 2018-11-14 RX ORDER — ALBUMIN (HUMAN) 12.5 G/50ML
25 SOLUTION INTRAVENOUS EVERY 8 HOURS
Status: DISCONTINUED | OUTPATIENT
Start: 2018-11-14 | End: 2018-11-20 | Stop reason: HOSPADM

## 2018-11-14 RX ORDER — HYDROMORPHONE HCL 110MG/55ML
0.5 PATIENT CONTROLLED ANALGESIA SYRINGE INTRAVENOUS ONCE
Status: COMPLETED | OUTPATIENT
Start: 2018-11-14 | End: 2018-11-14

## 2018-11-14 RX ORDER — PREDNISOLONE 15 MG/5ML
10 SOLUTION ORAL DAILY
Status: COMPLETED | OUTPATIENT
Start: 2018-11-14 | End: 2018-11-18

## 2018-11-14 RX ADMIN — FOLIC ACID 1 MG: 1 TABLET ORAL at 10:28

## 2018-11-14 RX ADMIN — HYDROMORPHONE HYDROCHLORIDE 0.5 MG: 2 INJECTION INTRAMUSCULAR; INTRAVENOUS; SUBCUTANEOUS at 23:21

## 2018-11-14 RX ADMIN — LISINOPRIL 20 MG: 20 TABLET ORAL at 10:28

## 2018-11-14 RX ADMIN — PANTOPRAZOLE SODIUM 40 MG: 40 TABLET, DELAYED RELEASE ORAL at 10:28

## 2018-11-14 RX ADMIN — OXYCODONE HYDROCHLORIDE 5 MG: 5 TABLET ORAL at 00:42

## 2018-11-14 RX ADMIN — PREDNISOLONE 10 MG: 15 SOLUTION ORAL at 16:23

## 2018-11-14 RX ADMIN — METOPROLOL SUCCINATE 25 MG: 25 TABLET, EXTENDED RELEASE ORAL at 10:28

## 2018-11-14 RX ADMIN — Medication 100 MG: at 10:28

## 2018-11-14 RX ADMIN — LACTULOSE 30 G: 20 SOLUTION ORAL at 23:22

## 2018-11-14 RX ADMIN — SODIUM CHLORIDE, PRESERVATIVE FREE 10 ML: 5 INJECTION INTRAVENOUS at 21:09

## 2018-11-14 RX ADMIN — SODIUM CHLORIDE 1000 ML: 9 INJECTION, SOLUTION INTRAVENOUS at 06:14

## 2018-11-14 RX ADMIN — LACTULOSE 30 G: 20 SOLUTION ORAL at 17:25

## 2018-11-14 RX ADMIN — ALBUMIN (HUMAN) 25 G: 0.25 INJECTION, SOLUTION INTRAVENOUS at 18:14

## 2018-11-14 RX ADMIN — SPIRONOLACTONE 25 MG: 25 TABLET ORAL at 10:28

## 2018-11-14 ASSESSMENT — PAIN DESCRIPTION - PAIN TYPE
TYPE: CHRONIC PAIN

## 2018-11-14 ASSESSMENT — PAIN SCALES - GENERAL
PAINLEVEL_OUTOF10: 7
PAINLEVEL_OUTOF10: 7
PAINLEVEL_OUTOF10: 10
PAINLEVEL_OUTOF10: 0
PAINLEVEL_OUTOF10: 10
PAINLEVEL_OUTOF10: 5
PAINLEVEL_OUTOF10: 7

## 2018-11-14 ASSESSMENT — PAIN DESCRIPTION - ONSET: ONSET: ON-GOING

## 2018-11-14 ASSESSMENT — PAIN DESCRIPTION - PROGRESSION: CLINICAL_PROGRESSION: NOT CHANGED

## 2018-11-14 ASSESSMENT — PAIN DESCRIPTION - LOCATION
LOCATION: ABDOMEN
LOCATION: GENERALIZED
LOCATION: ABDOMEN
LOCATION: ABDOMEN

## 2018-11-14 ASSESSMENT — PAIN DESCRIPTION - FREQUENCY: FREQUENCY: CONTINUOUS

## 2018-11-14 ASSESSMENT — PAIN DESCRIPTION - DESCRIPTORS: DESCRIPTORS: ACHING

## 2018-11-14 NOTE — ED NOTES
Bedside report given to NARDA Wadsworth at this time. Vs as noted. Questions/concerns addressed. Pt denies any concerns at this time.  Pt reports pain as tolerable at this time     Dwight Shafer RN  11/13/18 5736

## 2018-11-14 NOTE — CONSULTS
Gastroenterology Consult Note    Patient:   Idalmis Meyer   YOB: 1970   Facility:   [facility]   Referring/PCP: FLORIAN Owens CNP  Date:     11/14/2018  Consultant:   Arleth Granger MD    Subjective: This 41 y.o. male was admitted 11/13/2018 with a diagnosis of \"Hyponatremia [E87.1]  Hyponatremia [E87. 1]\" and is seen in consultation regarding \"cirrhosis\". Information was obtained from interview of the patient, examination of the patient, and review of records. I did update the past medical, surgical, social and / or family history. Cirrhosis severe in liver for months associated w ascites    Current status  Present  Diet Order: Diet NPO Time Specified   Recently, he has experienced no abdominal  Pain a  The patient has also experienced {no fever, hematochezia, melena, N/V, const/diarrhea    Prior to Admission medications    Medication Sig Start Date End Date Taking?  Authorizing Provider   prednisoLONE (PRELONE) 15 MG/5ML syrup Starting 10/19/18, take 35mg daily x 5 days, then take 30mg daily x 5 days, then 25mg daily x 5 days, then 20mg daily x 5 days, then 15mg daily x 5 days, 10mg daily x 5 days, 5mg daily x 5 days, then stop 10/18/18  Yes Tani Burnette MD   metoprolol succinate (TOPROL XL) 25 MG extended release tablet Take by mouth daily   Yes Historical Provider, MD   lisinopril (PRINIVIL;ZESTRIL) 20 MG tablet Take 1 tablet by mouth daily 2/16/16  Yes Gilmore Holter, MD   vitamin B-1 100 MG tablet Take 1 tablet by mouth daily 2/16/16  Yes Gilmore Holter, MD   pantoprazole (PROTONIX) 40 MG tablet Take 1 tablet by mouth daily 2/16/16  Yes Gilmore Holter, MD   furosemide (LASIX) 40 MG tablet Take 1 tablet by mouth daily for 10 doses 10/22/18 11/1/18  FLORIAN Chairez CNP   spironolactone (ALDACTONE) 25 MG tablet Take 1 tablet by mouth daily for 10 days 10/22/18 11/1/18  FLORIAN Chairez CNP      Scheduled Medications:   King Jamar metoprolol succinate  25 mg Oral Daily    lisinopril  20 mg Oral Daily    pantoprazole  40 mg Oral Daily    spironolactone  25 mg Oral Daily    sodium chloride flush  10 mL Intravenous 2 times per day    thiamine  100 mg Oral Daily    folic acid  1 mg Oral Daily    influenza virus vaccine  0.5 mL Intramuscular Once     Infusions:    sodium chloride 1,000 mL (18 3904)     PRN Medications: magnesium hydroxide, ondansetron, sodium chloride flush, LORazepam **OR** LORazepam **OR** LORazepam **OR** LORazepam **OR** LORazepam **OR** LORazepam **OR** LORazepam **OR** LORazepam  Allergies: No Known Allergies    Past Medical History:   Diagnosis Date    Cholecystitis     Hypercholesteremia     Hypertension     Mood changes      Past Surgical History:   Procedure Laterality Date    CERVICAL FUSION      C1 and C2    CHOLECYSTECTOMY      OTHER SURGICAL HISTORY  13    OPEN CHOLECYSTECTOMY    IL ESOPHAGOGASTRODUODENOSCOPY TRANSORAL DIAGNOSTIC N/A 10/17/2018    EGD WITH ANESTHESIA performed by Gold Doyle MD at [address] [facility]  10/17/2018    EGD BIOPSY performed by Gold Doyle MD at [address] [place] [address] [place]:   Social History   Substance Use Topics    Smoking status: Former Smoker     Quit date: 2012    Smokeless tobacco: Never Used    Alcohol use 4.2 oz/week     7 Cans of beer per week      Comment: 12pk/ day says quit 10/1/18     Family:   Family History   Problem Relation Age of Onset    Heart Disease Father        ROS: As outlined in HPI above    Objective:   Vital Signs:  Temp (24hrs), Av.7 °F (36.5 °C), Min:97.7 °F (36.5 °C), Max:97.79 °F (42.6 °C)     Systolic (52WHD), RDS:815 , Min:122 , YIK:339      Diastolic (80NRA), DZA:75, Min:60, Max:73     Pulse  Av  Min: 77  Max: 85  /72   Pulse 77   Temp 97.79 °F (36.6 °C) (Oral)   Resp 20   Ht 5' 10\" (1.778 m)   Wt 261 lb 1.6 oz (118.4 kg)   SpO2 98%   BMI 37.46 kg/m²

## 2018-11-14 NOTE — PROGRESS NOTES
Kenzie 5 or Facility: A From: Darin Velazquze RE: Nadeen Freitas RM: [id number] Ammonia level 255!  C4 PROGRESSIVE CARE

## 2018-11-15 ENCOUNTER — APPOINTMENT (OUTPATIENT)
Dept: GENERAL RADIOLOGY | Age: 48
DRG: 432 | End: 2018-11-15
Payer: COMMERCIAL

## 2018-11-15 LAB
A/G RATIO: 0.8 (ref 1.1–2.2)
ALBUMIN FLUID: <0.2 G/DL
ALBUMIN SERPL-MCNC: 2.7 G/DL (ref 3.4–5)
ALBUMIN SERPL-MCNC: 3 G/DL (ref 3.4–5)
ALP BLD-CCNC: 180 U/L (ref 40–129)
ALT SERPL-CCNC: 79 U/L (ref 10–40)
AMMONIA: 256 UMOL/L (ref 16–60)
ANION GAP SERPL CALCULATED.3IONS-SCNC: 11 MMOL/L (ref 3–16)
ANION GAP SERPL CALCULATED.3IONS-SCNC: 12 MMOL/L (ref 3–16)
ANISOCYTOSIS: ABNORMAL
APPEARANCE FLUID: CLEAR
AST SERPL-CCNC: 80 U/L (ref 15–37)
ATYPICAL LYMPHOCYTE RELATIVE PERCENT: 1 % (ref 0–6)
BACTERIA: ABNORMAL /HPF
BANDED NEUTROPHILS RELATIVE PERCENT: 2 % (ref 0–7)
BASOPHILS ABSOLUTE: 0 K/UL (ref 0–0.2)
BASOPHILS RELATIVE PERCENT: 0 %
BILIRUB SERPL-MCNC: 20 MG/DL (ref 0–1)
BILIRUBIN URINE: ABNORMAL
BLOOD, URINE: ABNORMAL
BUN BLDV-MCNC: 53 MG/DL (ref 7–20)
BUN BLDV-MCNC: 53 MG/DL (ref 7–20)
CALCIUM SERPL-MCNC: 9.1 MG/DL (ref 8.3–10.6)
CALCIUM SERPL-MCNC: 9.2 MG/DL (ref 8.3–10.6)
CELL COUNT FLUID TYPE: NORMAL
CHLORIDE BLD-SCNC: 91 MMOL/L (ref 99–110)
CHLORIDE BLD-SCNC: 95 MMOL/L (ref 99–110)
CHLORIDE URINE RANDOM: 33 MMOL/L
CLARITY: CLEAR
CLOT EVALUATION: NORMAL
CO2: 19 MMOL/L (ref 21–32)
CO2: 21 MMOL/L (ref 21–32)
COLOR FLUID: YELLOW
COLOR: ABNORMAL
CREAT SERPL-MCNC: 1.7 MG/DL (ref 0.9–1.3)
CREAT SERPL-MCNC: 1.9 MG/DL (ref 0.9–1.3)
CREATININE URINE: 70.4 MG/DL (ref 39–259)
EOSINOPHILS ABSOLUTE: 0.2 K/UL (ref 0–0.6)
EOSINOPHILS RELATIVE PERCENT: 2 %
FLUID TYPE: NORMAL
GFR AFRICAN AMERICAN: 46
GFR AFRICAN AMERICAN: 53
GFR NON-AFRICAN AMERICAN: 38
GFR NON-AFRICAN AMERICAN: 44
GLOBULIN: 3.6 G/DL
GLUCOSE BLD-MCNC: 116 MG/DL (ref 70–99)
GLUCOSE BLD-MCNC: 138 MG/DL (ref 70–99)
GLUCOSE URINE: NEGATIVE MG/DL
HCT VFR BLD CALC: 39.8 % (ref 40.5–52.5)
HEMOGLOBIN: 13.6 G/DL (ref 13.5–17.5)
KETONES, URINE: NEGATIVE MG/DL
LEUKOCYTE ESTERASE, URINE: ABNORMAL
LYMPHOCYTES ABSOLUTE: 0.7 K/UL (ref 1–5.1)
LYMPHOCYTES RELATIVE PERCENT: 5 %
LYMPHOCYTES, BODY FLUID: 1 %
MACROPHAGE FLUID: 73 %
MCH RBC QN AUTO: 34.6 PG (ref 26–34)
MCHC RBC AUTO-ENTMCNC: 34.1 G/DL (ref 31–36)
MCV RBC AUTO: 101.6 FL (ref 80–100)
MICROSCOPIC EXAMINATION: YES
MONOCYTE, FLUID: 1 %
MONOCYTES ABSOLUTE: 1.1 K/UL (ref 0–1.3)
MONOCYTES RELATIVE PERCENT: 9 %
MONONUCLEAR UNIDENTIFIED CELLS FLUID: 5 %
NEUTROPHIL, FLUID: 20 %
NEUTROPHILS ABSOLUTE: 9.7 K/UL (ref 1.7–7.7)
NEUTROPHILS RELATIVE PERCENT: 81 %
NITRITE, URINE: NEGATIVE
NUCLEATED CELLS FLUID: 196 /CUMM
NUMBER OF CELLS COUNTED FLUID: 100
PDW BLD-RTO: 16.1 % (ref 12.4–15.4)
PH UA: 6
PHOSPHORUS: 4.5 MG/DL (ref 2.5–4.9)
PLATELET # BLD: 139 K/UL (ref 135–450)
PLATELET SLIDE REVIEW: ADEQUATE
PMV BLD AUTO: 7.8 FL (ref 5–10.5)
POIKILOCYTES: ABNORMAL
POTASSIUM SERPL-SCNC: 5.1 MMOL/L (ref 3.5–5.1)
POTASSIUM SERPL-SCNC: 5.7 MMOL/L (ref 3.5–5.1)
POTASSIUM, UR: 21.5 MMOL/L
PROTEIN UA: NEGATIVE MG/DL
RBC # BLD: 3.92 M/UL (ref 4.2–5.9)
RBC FLUID: 700 /CUMM
RBC UA: ABNORMAL /HPF (ref 0–2)
REASON FOR REJECTION: NORMAL
REJECTED TEST: NORMAL
SLIDE REVIEW: ABNORMAL
SODIUM BLD-SCNC: 124 MMOL/L (ref 136–145)
SODIUM BLD-SCNC: 125 MMOL/L (ref 136–145)
SODIUM URINE: 37 MMOL/L
SPECIFIC GRAVITY UA: 1.01
TARGET CELLS: ABNORMAL
TOTAL PROTEIN: 6.3 G/DL (ref 6.4–8.2)
UROBILINOGEN, URINE: 2 E.U./DL
WBC # BLD: 11.7 K/UL (ref 4–11)
WBC UA: ABNORMAL /HPF (ref 0–5)

## 2018-11-15 PROCEDURE — 2000000000 HC ICU R&B

## 2018-11-15 PROCEDURE — 84300 ASSAY OF URINE SODIUM: CPT

## 2018-11-15 PROCEDURE — 74018 RADEX ABDOMEN 1 VIEW: CPT

## 2018-11-15 PROCEDURE — 0W9G3ZX DRAINAGE OF PERITONEAL CAVITY, PERCUTANEOUS APPROACH, DIAGNOSTIC: ICD-10-PCS | Performed by: INTERNAL MEDICINE

## 2018-11-15 PROCEDURE — 99291 CRITICAL CARE FIRST HOUR: CPT | Performed by: INTERNAL MEDICINE

## 2018-11-15 PROCEDURE — 6360000002 HC RX W HCPCS: Performed by: INTERNAL MEDICINE

## 2018-11-15 PROCEDURE — 2580000003 HC RX 258: Performed by: PEDIATRICS

## 2018-11-15 PROCEDURE — 51702 INSERT TEMP BLADDER CATH: CPT

## 2018-11-15 PROCEDURE — P9047 ALBUMIN (HUMAN), 25%, 50ML: HCPCS | Performed by: INTERNAL MEDICINE

## 2018-11-15 PROCEDURE — 82042 OTHER SOURCE ALBUMIN QUAN EA: CPT

## 2018-11-15 PROCEDURE — 2580000003 HC RX 258: Performed by: INTERNAL MEDICINE

## 2018-11-15 PROCEDURE — 82570 ASSAY OF URINE CREATININE: CPT

## 2018-11-15 PROCEDURE — 87205 SMEAR GRAM STAIN: CPT

## 2018-11-15 PROCEDURE — 6370000000 HC RX 637 (ALT 250 FOR IP): Performed by: INTERNAL MEDICINE

## 2018-11-15 PROCEDURE — 49083 ABD PARACENTESIS W/IMAGING: CPT | Performed by: INTERNAL MEDICINE

## 2018-11-15 PROCEDURE — 85025 COMPLETE CBC W/AUTO DIFF WBC: CPT

## 2018-11-15 PROCEDURE — 82436 ASSAY OF URINE CHLORIDE: CPT

## 2018-11-15 PROCEDURE — 81001 URINALYSIS AUTO W/SCOPE: CPT

## 2018-11-15 PROCEDURE — 80053 COMPREHEN METABOLIC PANEL: CPT

## 2018-11-15 PROCEDURE — 6370000000 HC RX 637 (ALT 250 FOR IP): Performed by: PEDIATRICS

## 2018-11-15 PROCEDURE — 89051 BODY FLUID CELL COUNT: CPT

## 2018-11-15 PROCEDURE — 87070 CULTURE OTHR SPECIMN AEROBIC: CPT

## 2018-11-15 PROCEDURE — 84133 ASSAY OF URINE POTASSIUM: CPT

## 2018-11-15 PROCEDURE — 82140 ASSAY OF AMMONIA: CPT

## 2018-11-15 PROCEDURE — 36415 COLL VENOUS BLD VENIPUNCTURE: CPT

## 2018-11-15 RX ORDER — SODIUM CHLORIDE 9 MG/ML
INJECTION, SOLUTION INTRAVENOUS CONTINUOUS
Status: DISCONTINUED | OUTPATIENT
Start: 2018-11-15 | End: 2018-11-16

## 2018-11-15 RX ORDER — SODIUM POLYSTYRENE SULFONATE 15 G/60ML
30 SUSPENSION ORAL; RECTAL ONCE
Status: COMPLETED | OUTPATIENT
Start: 2018-11-15 | End: 2018-11-15

## 2018-11-15 RX ORDER — FUROSEMIDE 20 MG/1
20 TABLET ORAL DAILY
Status: DISCONTINUED | OUTPATIENT
Start: 2018-11-15 | End: 2018-11-15

## 2018-11-15 RX ORDER — SPIRONOLACTONE 25 MG/1
50 TABLET ORAL DAILY
Status: DISCONTINUED | OUTPATIENT
Start: 2018-11-15 | End: 2018-11-15

## 2018-11-15 RX ADMIN — RIFAXIMIN 200 MG: 200 TABLET ORAL at 14:37

## 2018-11-15 RX ADMIN — SODIUM POLYSTYRENE SULFONATE 30 G: 15 SUSPENSION ORAL; RECTAL at 14:37

## 2018-11-15 RX ADMIN — PANTOPRAZOLE SODIUM 40 MG: 40 TABLET, DELAYED RELEASE ORAL at 10:35

## 2018-11-15 RX ADMIN — METOPROLOL SUCCINATE 25 MG: 25 TABLET, EXTENDED RELEASE ORAL at 10:35

## 2018-11-15 RX ADMIN — LACTULOSE 30 G: 20 SOLUTION ORAL at 18:43

## 2018-11-15 RX ADMIN — SODIUM CHLORIDE 125 ML/HR: 9 INJECTION, SOLUTION INTRAVENOUS at 22:15

## 2018-11-15 RX ADMIN — ALBUMIN (HUMAN) 25 G: 0.25 INJECTION, SOLUTION INTRAVENOUS at 09:55

## 2018-11-15 RX ADMIN — ALBUMIN (HUMAN) 25 G: 0.25 INJECTION, SOLUTION INTRAVENOUS at 02:53

## 2018-11-15 RX ADMIN — LACTULOSE 30 G: 20 SOLUTION ORAL at 14:37

## 2018-11-15 RX ADMIN — PREDNISOLONE 10 MG: 15 SOLUTION ORAL at 09:57

## 2018-11-15 RX ADMIN — SODIUM CHLORIDE: 9 INJECTION, SOLUTION INTRAVENOUS at 14:04

## 2018-11-15 RX ADMIN — RIFAXIMIN 200 MG: 200 TABLET ORAL at 21:33

## 2018-11-15 RX ADMIN — SODIUM CHLORIDE, PRESERVATIVE FREE 10 ML: 5 INJECTION INTRAVENOUS at 21:33

## 2018-11-15 RX ADMIN — LACTULOSE 30 G: 20 SOLUTION ORAL at 06:16

## 2018-11-15 RX ADMIN — ALBUMIN (HUMAN) 25 G: 0.25 INJECTION, SOLUTION INTRAVENOUS at 18:43

## 2018-11-15 ASSESSMENT — PAIN SCALES - GENERAL: PAINLEVEL_OUTOF10: 0

## 2018-11-15 NOTE — PROGRESS NOTES
Spoke with Dr. Steve Chi. Aware of admission to ICU and consult to case. Dr. Steve Chi to come see patient.

## 2018-11-15 NOTE — CONSULTS
[facility]    Consult Note           Reason for Consult:  Justin  Requesting Physician:  Dr. Brittany Ye    Chief Complaint:  Jaundice, elevated cr    History of Present Illness on 11/15/18:    41 y.o. yo male with PMH of HTN, HLD, ETOH cirrhosis of liver who is admitted for justin  Pt had routine labs done per Dr Paula Dickson on 11/12 ans was found to have cr of 1.9 and was asked to come to ED  On 11/13 his cr was down to 1.2 with na of 118 and k of 5.4 and was admitted  He was started back on lisinopril and aldactone on 11/14 and his cr has increased back to 1.9 on 11/15 and k up to 5.7  He has been obtunded and cannot give any history    Past Medical History:        Diagnosis Date    Cholecystitis     Hypercholesteremia     Hypertension     Mood changes        Past Surgical History:        Procedure Laterality Date    CERVICAL FUSION  1992    C1 and C2    CHOLECYSTECTOMY      OTHER SURGICAL HISTORY  8/30/13    OPEN CHOLECYSTECTOMY    NC ESOPHAGOGASTRODUODENOSCOPY TRANSORAL DIAGNOSTIC N/A 10/17/2018    EGD WITH ANESTHESIA performed by Shoaib Méndez MD at [address] [facility]  10/17/2018    EGD BIOPSY performed by Shoaib Méndez MD at [facility] Medications:    No current facility-administered medications on file prior to encounter.       Current Outpatient Prescriptions on File Prior to Encounter   Medication Sig Dispense Refill    prednisoLONE (PRELONE) 15 MG/5ML syrup Starting 10/19/18, take 35mg daily x 5 days, then take 30mg daily x 5 days, then 25mg daily x 5 days, then 20mg daily x 5 days, then 15mg daily x 5 days, 10mg daily x 5 days, 5mg daily x 5 days, then stop 300 mL 0    metoprolol succinate (TOPROL XL) 25 MG extended release tablet Take by mouth daily      lisinopril (PRINIVIL;ZESTRIL) 20 MG tablet Take 1 tablet by mouth daily 30 tablet 3    vitamin B-1 100 MG tablet Take 1 tablet by mouth daily 30 tablet 3    pantoprazole

## 2018-11-15 NOTE — PROGRESS NOTES
Infusions:      PRN Meds:  magnesium hydroxide, ondansetron, sodium chloride flush, LORazepam **OR** LORazepam **OR** LORazepam **OR** LORazepam **OR** LORazepam **OR** LORazepam **OR** LORazepam **OR** LORazepam    Intake and Output     Intake/Output Summary (Last 24 hours) at 11/15/18 1042  Last data filed at 11/15/18 1766   Gross per 24 hour   Intake           1507.5 ml   Output              500 ml   Net           1007.5 ml       Vitals    /65   Pulse 71   Temp 97.9 °F (36.6 °C) (Oral)   Resp 18   Ht 5' 10\" (1.778 m)   Wt 263 lb 6.4 oz (119.5 kg)   SpO2 98%   BMI 37.79 kg/m²     Exam:    Gen: Lethargic, Jaundice appearing, NAD, difficult to arouse, cannot follow commands. Lungs: CTA B w/out Rales/Wheezes/Rhonchi, Good respiratory effort, diminished to bases bilat, Easy and non labored breathing. Cor: RRR, S1S2, w/out M/R/G, non-displaced PMI  Vascular: no calf tenderness, negative Herrera's sign, no calf cords  Abdomen: Soft, mild generalized tenderness on light palpation, mod distention, + fluid wave, w/out R/G, w/ +BSx4  Ext: No C/C/ Bilaterally. +1 pitting edema to LE bilat. Neuro: + Asterixis, Slowed neural responses, Grossly non focal, Neurovascularly intact w/ Sensory/Motor intact UE/LE Bilaterally. Skin: Skin color Jaundiced, texture, turgor normal. Without Rashes/Lesions.   Musculoskeletal: No evidence of joint instability, strength normal, no evidence of muscle atrophy, no deformities present    Data    Recent Labs      11/13/18   1909  11/15/18   1016   WBC  11.4*  11.7*   HGB  14.1  13.6   HCT  41.5  39.8*   PLT  144  139      Recent Labs      11/12/18   1818  11/13/18   1909  11/15/18   1016   NA  123*  118*  124*   K  5.7*  5.4*  5.7*   CL  90*  86*  91*   CO2  18*  21  21   BUN  59*  56*  53*   CREATININE  1.9*  1.2  1.9*     Recent Labs      11/13/18   1909  11/15/18   1016   AST  99*  80*   ALT  92*  79*   BILITOT  19.4*  20.0*   ALKPHOS  202*  180*     Recent Labs

## 2018-11-16 LAB
ALBUMIN SERPL-MCNC: 2.9 G/DL (ref 3.4–5)
ALBUMIN SERPL-MCNC: 3.2 G/DL (ref 3.4–5)
ALP BLD-CCNC: 140 U/L (ref 40–129)
ALT SERPL-CCNC: 64 U/L (ref 10–40)
AMMONIA: 85 UMOL/L (ref 16–60)
ANION GAP SERPL CALCULATED.3IONS-SCNC: 11 MMOL/L (ref 3–16)
ANION GAP SERPL CALCULATED.3IONS-SCNC: 11 MMOL/L (ref 3–16)
AST SERPL-CCNC: 66 U/L (ref 15–37)
BASOPHILS ABSOLUTE: 0.1 K/UL (ref 0–0.2)
BASOPHILS RELATIVE PERCENT: 0.6 %
BILIRUB SERPL-MCNC: 19 MG/DL (ref 0–1)
BILIRUBIN DIRECT: >10 MG/DL (ref 0–0.3)
BILIRUBIN, INDIRECT: ABNORMAL MG/DL (ref 0–1)
BUN BLDV-MCNC: 46 MG/DL (ref 7–20)
BUN BLDV-MCNC: 50 MG/DL (ref 7–20)
CALCIUM SERPL-MCNC: 9 MG/DL (ref 8.3–10.6)
CALCIUM SERPL-MCNC: 9.1 MG/DL (ref 8.3–10.6)
CHLORIDE BLD-SCNC: 96 MMOL/L (ref 99–110)
CHLORIDE BLD-SCNC: 99 MMOL/L (ref 99–110)
CO2: 19 MMOL/L (ref 21–32)
CO2: 19 MMOL/L (ref 21–32)
CREAT SERPL-MCNC: 1.1 MG/DL (ref 0.9–1.3)
CREAT SERPL-MCNC: 1.5 MG/DL (ref 0.9–1.3)
EOSINOPHILS ABSOLUTE: 0.1 K/UL (ref 0–0.6)
EOSINOPHILS RELATIVE PERCENT: 0.9 %
GFR AFRICAN AMERICAN: >60
GFR AFRICAN AMERICAN: >60
GFR NON-AFRICAN AMERICAN: 50
GFR NON-AFRICAN AMERICAN: >60
GLUCOSE BLD-MCNC: 104 MG/DL (ref 70–99)
GLUCOSE BLD-MCNC: 108 MG/DL (ref 70–99)
HCT VFR BLD CALC: 35.1 % (ref 40.5–52.5)
HEMOGLOBIN: 12.2 G/DL (ref 13.5–17.5)
LYMPHOCYTES ABSOLUTE: 1 K/UL (ref 1–5.1)
LYMPHOCYTES RELATIVE PERCENT: 9.9 %
MCH RBC QN AUTO: 35.1 PG (ref 26–34)
MCHC RBC AUTO-ENTMCNC: 34.8 G/DL (ref 31–36)
MCV RBC AUTO: 100.8 FL (ref 80–100)
MONOCYTES ABSOLUTE: 1 K/UL (ref 0–1.3)
MONOCYTES RELATIVE PERCENT: 9.9 %
NEUTROPHILS ABSOLUTE: 7.6 K/UL (ref 1.7–7.7)
NEUTROPHILS RELATIVE PERCENT: 78.7 %
PATH CONSULT FLUID: NORMAL
PDW BLD-RTO: 15.7 % (ref 12.4–15.4)
PHOSPHORUS: 3.8 MG/DL (ref 2.5–4.9)
PHOSPHORUS: 4.2 MG/DL (ref 2.5–4.9)
PLATELET # BLD: 104 K/UL (ref 135–450)
PMV BLD AUTO: 7.9 FL (ref 5–10.5)
POTASSIUM SERPL-SCNC: 5 MMOL/L (ref 3.5–5.1)
POTASSIUM SERPL-SCNC: 5.1 MMOL/L (ref 3.5–5.1)
RBC # BLD: 3.48 M/UL (ref 4.2–5.9)
SODIUM BLD-SCNC: 126 MMOL/L (ref 136–145)
SODIUM BLD-SCNC: 129 MMOL/L (ref 136–145)
TOTAL PROTEIN: 5.9 G/DL (ref 6.4–8.2)
WBC # BLD: 9.7 K/UL (ref 4–11)

## 2018-11-16 PROCEDURE — 6360000002 HC RX W HCPCS: Performed by: INTERNAL MEDICINE

## 2018-11-16 PROCEDURE — 87086 URINE CULTURE/COLONY COUNT: CPT

## 2018-11-16 PROCEDURE — 87077 CULTURE AEROBIC IDENTIFY: CPT

## 2018-11-16 PROCEDURE — 2000000000 HC ICU R&B

## 2018-11-16 PROCEDURE — 2580000003 HC RX 258: Performed by: INTERNAL MEDICINE

## 2018-11-16 PROCEDURE — 99233 SBSQ HOSP IP/OBS HIGH 50: CPT | Performed by: INTERNAL MEDICINE

## 2018-11-16 PROCEDURE — 80076 HEPATIC FUNCTION PANEL: CPT

## 2018-11-16 PROCEDURE — P9047 ALBUMIN (HUMAN), 25%, 50ML: HCPCS | Performed by: INTERNAL MEDICINE

## 2018-11-16 PROCEDURE — 6370000000 HC RX 637 (ALT 250 FOR IP): Performed by: INTERNAL MEDICINE

## 2018-11-16 PROCEDURE — 80048 BASIC METABOLIC PNL TOTAL CA: CPT

## 2018-11-16 PROCEDURE — 82140 ASSAY OF AMMONIA: CPT

## 2018-11-16 PROCEDURE — 84100 ASSAY OF PHOSPHORUS: CPT

## 2018-11-16 PROCEDURE — 87186 SC STD MICRODIL/AGAR DIL: CPT

## 2018-11-16 PROCEDURE — 6370000000 HC RX 637 (ALT 250 FOR IP): Performed by: PEDIATRICS

## 2018-11-16 PROCEDURE — 36415 COLL VENOUS BLD VENIPUNCTURE: CPT

## 2018-11-16 PROCEDURE — 85025 COMPLETE CBC W/AUTO DIFF WBC: CPT

## 2018-11-16 PROCEDURE — 2580000003 HC RX 258: Performed by: PEDIATRICS

## 2018-11-16 RX ADMIN — MUPIROCIN: 20 OINTMENT TOPICAL at 21:46

## 2018-11-16 RX ADMIN — MUPIROCIN: 20 OINTMENT TOPICAL at 08:38

## 2018-11-16 RX ADMIN — Medication 100 MG: at 08:22

## 2018-11-16 RX ADMIN — METOPROLOL TARTRATE 12.5 MG: 25 TABLET ORAL at 12:09

## 2018-11-16 RX ADMIN — ALBUMIN (HUMAN) 25 G: 0.25 INJECTION, SOLUTION INTRAVENOUS at 19:25

## 2018-11-16 RX ADMIN — LACTULOSE 30 G: 20 SOLUTION ORAL at 12:08

## 2018-11-16 RX ADMIN — LACTULOSE 30 G: 20 SOLUTION ORAL at 07:01

## 2018-11-16 RX ADMIN — PREDNISOLONE 10 MG: 15 SOLUTION ORAL at 08:26

## 2018-11-16 RX ADMIN — SODIUM CHLORIDE: 9 INJECTION, SOLUTION INTRAVENOUS at 07:11

## 2018-11-16 RX ADMIN — FOLIC ACID 1 MG: 1 TABLET ORAL at 08:22

## 2018-11-16 RX ADMIN — PANTOPRAZOLE SODIUM 40 MG: 40 TABLET, DELAYED RELEASE ORAL at 08:22

## 2018-11-16 RX ADMIN — SODIUM CHLORIDE, PRESERVATIVE FREE 10 ML: 5 INJECTION INTRAVENOUS at 21:46

## 2018-11-16 RX ADMIN — RIFAXIMIN 200 MG: 200 TABLET ORAL at 21:45

## 2018-11-16 RX ADMIN — RIFAXIMIN 200 MG: 200 TABLET ORAL at 16:01

## 2018-11-16 RX ADMIN — SODIUM CHLORIDE, PRESERVATIVE FREE 10 ML: 5 INJECTION INTRAVENOUS at 08:22

## 2018-11-16 RX ADMIN — LACTULOSE 30 G: 20 SOLUTION ORAL at 19:25

## 2018-11-16 RX ADMIN — RIFAXIMIN 200 MG: 200 TABLET ORAL at 08:22

## 2018-11-16 RX ADMIN — ALBUMIN (HUMAN) 25 G: 0.25 INJECTION, SOLUTION INTRAVENOUS at 02:37

## 2018-11-16 RX ADMIN — ALBUMIN (HUMAN) 25 G: 0.25 INJECTION, SOLUTION INTRAVENOUS at 08:22

## 2018-11-16 RX ADMIN — METOPROLOL TARTRATE 12.5 MG: 25 TABLET ORAL at 21:46

## 2018-11-16 RX ADMIN — LACTULOSE 30 G: 20 SOLUTION ORAL at 00:57

## 2018-11-16 ASSESSMENT — PAIN SCALES - GENERAL: PAINLEVEL_OUTOF10: 0

## 2018-11-16 NOTE — CONSULTS
11/13/2018    RUQ pain 10/11/2018    Hyperbilirubinemia 10/11/2018    Chest pain 02/15/2016    Alcohol abuse 02/15/2016    S/P cholecystectomy 09/23/2013    Cholelithiasis 08/29/2013       Past Medical History:   Diagnosis Date    Cholecystitis     Hypercholesteremia     Hypertension     Mood changes         Past Surgical History:   Procedure Laterality Date    CERVICAL FUSION  1992    C1 and C2    CHOLECYSTECTOMY      OTHER SURGICAL HISTORY  8/30/13    OPEN CHOLECYSTECTOMY    [place] ESOPHAGOGASTRODUODENOSCOPY TRANSORAL DIAGNOSTIC N/A 10/17/2018    EGD WITH ANESTHESIA performed by Stef Mcnamara MD at [place] [address]  10/17/2018    EGD BIOPSY performed by Stef Mcnamara MD at [address]        Family History   Problem Relation Age of Onset    Heart Disease Father         Social History   Substance Use Topics    Smoking status: Former Smoker     Quit date: 12/29/2012    Smokeless tobacco: Never Used    Alcohol use 4.2 oz/week     7 Cans of beer per week      Comment: 12pk/ day says quit 10/1/18        No Known Allergies      Vital Signs:  Blood pressure 118/65, pulse 77, temperature 97.4 °F (36.3 °C), temperature source Oral, resp. rate 12, height 5' 10\" (1.778 m), weight 267 lb 3.2 oz (121.2 kg), SpO2 98 %.' on RA  Body mass index is 38.34 kg/m². Intake/Output Summary (Last 24 hours) at 11/15/18 1943  Last data filed at 11/15/18 1735   Gross per 24 hour   Intake              720 ml   Output             1275 ml   Net             -555 ml         PHYSICAL EXAM:  General:  Well nourished, well developed, obese, clinically jaundiced, in no respiratory distress. Eyes:  Deep scleral icterus, no periorbital edema. PERRL  Head: Atraumatic, normocephalic. Spider nevi on forehead  ENMT: Limited exam as the patient does not follow commands. Mucosa appears dry and crowded. Neck: No palpable goiter, no lymphadenopathy, no JVD. Short and large neck.   No absent. The spleen is upper limits of normal in size. Noncontrast images of the adrenal glands, pancreas, and kidneys show no acute abnormality. GI/Bowel: The bowel shows normal caliber and course without suspected wall thickening normal appendix. Pelvis: The prostate gland and urinary bladder are within normal limits. Vasculature: The aorta is normal in caliber and course, showing mild calcifications. There is recanalization of the umbilical vein is well as perisplenic, retroperitoneal and mesenteric venous collaterals. Bones/Soft Tissues: No acute bony abnormality. There is large amount of abdominopelvic ascites as well as mesenteric edema. No focal fluid collection or extraluminal gas. No pathologically enlarged abdominopelvic lymph nodes. Cirrhotic morphology liver with evidence of portal venous hypertension including venous collaterals, mesenteric edema and large amount of abdominopelvic ascites. Small left pleural effusion with minimal dependent left lower lobe airspace disease, likely atelectasis. Xr Abdomen (kub) (single Ap View)    Result Date: 11/15/2018  EXAMINATION: SINGLE SUPINE XRAY VIEW(S) OF THE ABDOMEN 11/15/2018 1:35 pm COMPARISON: Abdominal radiograph on 11/15/2018 HISTORY: ORDERING SYSTEM PROVIDED HISTORY: NGT placement TECHNOLOGIST PROVIDED HISTORY: Reason for exam:->NGT placement FINDINGS: Gastric drain terminates over the gastric body. Imaging limited by body habitus and motion artifact. Gaseous distention of colon grossly evident. The pelvis is excluded from the field of view. Gastric drain terminates in the gastric body. Xr Abdomen (kub) (single Ap View)    Result Date: 11/15/2018  EXAMINATION: SINGLE SUPINE XRAY VIEW(S) OF THE ABDOMEN 11/15/2018 11:56 am COMPARISON: CT abdomen and pelvis 11/13/2018.  HISTORY: ORDERING SYSTEM PROVIDED HISTORY: NG tube placement TECHNOLOGIST PROVIDED HISTORY: Reason for exam:->NG tube placement Ordering Physician Provided Reason for Exam:

## 2018-11-16 NOTE — PROGRESS NOTES
times per day    thiamine  100 mg Oral Daily    folic acid  1 mg Oral Daily    influenza virus vaccine  0.5 mL Intramuscular Once       PRN Meds:  magnesium hydroxide, ondansetron, sodium chloride flush, LORazepam **OR** LORazepam **OR** LORazepam **OR** LORazepam **OR** LORazepam **OR** LORazepam **OR** LORazepam **OR** LORazepam    Results:  CBC:   Recent Labs      11/13/18   1909  11/15/18   1016  11/16/18   0415   WBC  11.4*  11.7*  9.7   HGB  14.1  13.6  12.2*   HCT  41.5  39.8*  35.1*   MCV  100.5*  101.6*  100.8*   PLT  144  139  104*     BMP:   Recent Labs      11/15/18   1955  11/15/18   2348  11/16/18   0415   NA  125*  126*  129*   K  5.1  5.1  5.0   CL  95*  96*  99   CO2  19*  19*  19*   PHOS  4.5  4.2  3.8   BUN  53*  50*  46*   CREATININE  1.7*  1.5*  1.1     LIVER PROFILE:   Recent Labs      11/13/18   1909  11/15/18   1016  11/16/18   0415   AST  99*  80*  66*   ALT  92*  79*  64*   BILIDIR   --    --   >10.0*   BILITOT  19.4*  20.0*  19.0*   ALKPHOS  202*  180*  140*     PT/INR:   Recent Labs      11/13/18 1809   PROTIME  17.0*   INR  1.49*     APTT: No results for input(s): APTT in the last 72 hours. UA:  Recent Labs      11/15/18   1400   COLORU  MARÍA*   PHUR  6.0   WBCUA  20-50*   RBCUA  3-5*   BACTERIA  2+*   CLARITYU  Clear   SPECGRAV  1.010   LEUKOCYTESUR  TRACE*   UROBILINOGEN  2.0*   BILIRUBINUR  MODERATE*   BLOODU  TRACE-INTACT*   GLUCOSEU  Negative       Cultures:  Negative so far    Films:  CXR reviewed by me      Assessment and plan:  Hepatic encephalopathy, altered mentation. Elevated ammonia levels. NG has been placed. On lactulose, rifaximin. Clinically improved overnight, awake and conversant. Alcoholic hepatitis. GI following. On prednisolone. Ascites. Nephrology recommending against large volume paracentesis. Bedside diagnostic paracentesis 11/15. Dr. Wilfred Miller feels therapeutic paracentesis can be pursued as renal function is improving. Acute kidney injury. Nephrology consulted. Diuretics and ACE inhibitor held, Fields placed. Received Kayexalate for hyperkalemia. Renal function improved. Hyponatremia. Likely due to alcoholic liver disease. NS recommended by nephrology, would be concerned about worsening of dilutional hyponatremia, worsening of ascites. Sodium level better. Alcohol withdrawal.  Has been receiving when necessary Ativan. If he truly has not drunk alcohol since 10/10, would hold benzodiazepines. Per Dr. Joaquin Hurtado. DVT prophylaxis. Would recommend SCDs, since platelets have been low and he is mildly coagulopathic.         Multidisciplinary rounds were completed at the bedside with participation from the intensivist, pharmacy, nursing, nutrition. All labs and imaging studies reviewed, discussed with family.         Electronically signed by:  Dave Beltran MD    11/16/2018    8:14 AM.

## 2018-11-16 NOTE — PROGRESS NOTES
influenza virus vaccine  0.5 mL Intramuscular Once       Infusions:      PRN Meds:  magnesium hydroxide, ondansetron, sodium chloride flush, LORazepam **OR** LORazepam **OR** LORazepam **OR** LORazepam **OR** LORazepam **OR** LORazepam **OR** LORazepam **OR** LORazepam    Intake and Output     Intake/Output Summary (Last 24 hours) at 11/16/18 1634  Last data filed at 11/16/18 1353   Gross per 24 hour   Intake          3004.42 ml   Output             3275 ml   Net          -270.58 ml       Vitals    /73   Pulse 82   Temp 98.2 °F (36.8 °C) (Axillary)   Resp 14   Ht 5' 10\" (1.778 m)   Wt 262 lb 9.1 oz (119.1 kg)   SpO2 99%   BMI 37.67 kg/m²     Exam:    Gen: Awakes to name, Jaundice appearing, NAD, more alert and interactive.,   Lungs: CTA B w/out Rales/Wheezes/Rhonchi, Good respiratory effort, diminished to bases bilat, Easy and non labored breathing. Cor: RRR, S1S2, w/out M/R/G, non-displaced PMI  Vascular: no calf tenderness, negative Herrera's sign, no calf cords  Abdomen: Soft, mild generalized tenderness on light palpation, mod distention, + fluid wave, w/out R/G, w/ +BSx4  Ext: No C/C/ Bilaterally. +1 pitting edema to LE bilat. Neuro: + Asterixis, Slowed neural responses, Grossly non focal, Neurovascularly intact w/ Sensory/Motor intact UE/LE Bilaterally. Skin: Skin color Jaundiced, texture, turgor normal. Without Rashes/Lesions.   Musculoskeletal: No evidence of joint instability, strength normal, no evidence of muscle atrophy, no deformities present    Data    Recent Labs      11/13/18   1909  11/15/18   1016  11/16/18   0415   WBC  11.4*  11.7*  9.7   HGB  14.1  13.6  12.2*   HCT  41.5  39.8*  35.1*   PLT  144  139  104*      Recent Labs      11/15/18   1955  11/15/18   2348  11/16/18   0415   NA  125*  126*  129*   K  5.1  5.1  5.0   CL  95*  96*  99   CO2  19*  19*  19*   PHOS  4.5  4.2  3.8   BUN  53*  50*  46*   CREATININE  1.7*  1.5*  1.1     Recent Labs      11/13/18   1909  11/15/18

## 2018-11-16 NOTE — PROGRESS NOTES
jvd noted, thyroid normal  Cardiovascular:  S1, S2 without m/r/g  Respiratory: diminished   Abdomen:  +bs, soft, nt, nd, no hepatosplenomegaly  Ext: no lower extremity edema  Neuro/Psy: obtunded , moves all extremities    Data/  Recent Labs      11/13/18   1909  11/15/18   1016  11/16/18   0415   WBC  11.4*  11.7*  9.7   HGB  14.1  13.6  12.2*   HCT  41.5  39.8*  35.1*   MCV  100.5*  101.6*  100.8*   PLT  144  139  104*     Recent Labs      11/15/18   1955  11/15/18   2348  11/16/18   0415   NA  125*  126*  129*   K  5.1  5.1  5.0   CL  95*  96*  99   CO2  19*  19*  19*   GLUCOSE  138*  108*  104*   PHOS  4.5  4.2  3.8   BUN  53*  50*  46*   CREATININE  1.7*  1.5*  1.1   LABGLOM  44*  50*  >60   GFRAA  53*  >60  >60     CT scan a/p wo contrast from 11/13/18  Impression   Cirrhotic morphology liver with evidence of portal venous hypertension   including venous collaterals, mesenteric edema and large amount of   abdominopelvic ascites.       Small left pleural effusion with minimal dependent left lower lobe airspace   disease, likely atelectasis. -UA w micro w 20-50 wbcs otherwise nml  -urine na 37, cr 70    Assessment  -FREDRICK- related to pre-renal etiology with diuretic/ACEI use, improved w IVF. No hydronephrosis noted on CT scan from 11/13.   -hyperkalemia   -improved  -Hyponatremia   -was pre-renal and improved with IVF last na on 10/21 was 133   -Acute hepatic encephalopathy    -improved   -Cirrhosis of liver   -Elevated Hyperbilirubinenmia of 20 w elevated liver enzymes and INR  -HTN   stable  -Thrombocytopenia    Plan  -Hold lasix, aldactone for now  -DC IVF  -do not resume ACEI for HTN d/t hyperkalemia risk  -urine culture  -hold metoprolol for SBP<120  -ok for therapeutic paracentesis with iv albumin  -agree with iv albumin and lactulose as ordered   -Serial renal panel  -daily wts and strict i/o  -renal dose medications   -avoid nephrotoxins        Thank you for the consultation.   Please do not hesitate to

## 2018-11-16 NOTE — PROGRESS NOTES
CT since diuretics were withheld a few days ago. Developed encephalopathy last night. EGD 10/2018 w gastritis/PHG and ?GP.    Plan:   1. Continue ETOH abstinence  2. F/U electrolytes  3. Will defer diuretics and paracenthesis management to Nephro  4. Continue albumin  5. Consider liver transplant referral in a few months  6.  Will follow     Skyler Bennett MD       (O) [phone]    Skyler Bennett MD  11/16/2018

## 2018-11-17 LAB
ALBUMIN SERPL-MCNC: 3.2 G/DL (ref 3.4–5)
ANION GAP SERPL CALCULATED.3IONS-SCNC: 9 MMOL/L (ref 3–16)
BUN BLDV-MCNC: 28 MG/DL (ref 7–20)
CALCIUM SERPL-MCNC: 9.3 MG/DL (ref 8.3–10.6)
CHLORIDE BLD-SCNC: 101 MMOL/L (ref 99–110)
CO2: 21 MMOL/L (ref 21–32)
CREAT SERPL-MCNC: <0.5 MG/DL (ref 0.9–1.3)
GFR AFRICAN AMERICAN: >60
GFR NON-AFRICAN AMERICAN: >60
GLUCOSE BLD-MCNC: 99 MG/DL (ref 70–99)
PHOSPHORUS: 2.7 MG/DL (ref 2.5–4.9)
POTASSIUM SERPL-SCNC: 4.9 MMOL/L (ref 3.5–5.1)
SODIUM BLD-SCNC: 131 MMOL/L (ref 136–145)

## 2018-11-17 PROCEDURE — 80069 RENAL FUNCTION PANEL: CPT

## 2018-11-17 PROCEDURE — 36415 COLL VENOUS BLD VENIPUNCTURE: CPT

## 2018-11-17 PROCEDURE — 6370000000 HC RX 637 (ALT 250 FOR IP): Performed by: INTERNAL MEDICINE

## 2018-11-17 PROCEDURE — G0008 ADMIN INFLUENZA VIRUS VAC: HCPCS | Performed by: PEDIATRICS

## 2018-11-17 PROCEDURE — 1200000000 HC SEMI PRIVATE

## 2018-11-17 PROCEDURE — 2580000003 HC RX 258

## 2018-11-17 PROCEDURE — 99232 SBSQ HOSP IP/OBS MODERATE 35: CPT | Performed by: INTERNAL MEDICINE

## 2018-11-17 PROCEDURE — P9047 ALBUMIN (HUMAN), 25%, 50ML: HCPCS | Performed by: INTERNAL MEDICINE

## 2018-11-17 PROCEDURE — 6360000002 HC RX W HCPCS: Performed by: PEDIATRICS

## 2018-11-17 PROCEDURE — 6370000000 HC RX 637 (ALT 250 FOR IP): Performed by: PEDIATRICS

## 2018-11-17 PROCEDURE — 90686 IIV4 VACC NO PRSV 0.5 ML IM: CPT | Performed by: PEDIATRICS

## 2018-11-17 PROCEDURE — 6360000002 HC RX W HCPCS: Performed by: INTERNAL MEDICINE

## 2018-11-17 RX ORDER — SODIUM CHLORIDE 9 MG/ML
INJECTION, SOLUTION INTRAVENOUS
Status: COMPLETED
Start: 2018-11-17 | End: 2018-11-17

## 2018-11-17 RX ORDER — PHENAZOPYRIDINE HYDROCHLORIDE 100 MG/1
200 TABLET, FILM COATED ORAL
Status: DISCONTINUED | OUTPATIENT
Start: 2018-11-17 | End: 2018-11-20 | Stop reason: HOSPADM

## 2018-11-17 RX ADMIN — PANTOPRAZOLE SODIUM 40 MG: 40 TABLET, DELAYED RELEASE ORAL at 08:08

## 2018-11-17 RX ADMIN — METOPROLOL TARTRATE 12.5 MG: 25 TABLET ORAL at 08:08

## 2018-11-17 RX ADMIN — LACTULOSE 30 G: 20 SOLUTION ORAL at 14:13

## 2018-11-17 RX ADMIN — PHENAZOPYRIDINE HYDROCHLORIDE 200 MG: 100 TABLET ORAL at 18:40

## 2018-11-17 RX ADMIN — MUPIROCIN: 20 OINTMENT TOPICAL at 08:10

## 2018-11-17 RX ADMIN — LACTULOSE 30 G: 20 SOLUTION ORAL at 00:05

## 2018-11-17 RX ADMIN — INFLUENZA A VIRUS A/MICHIGAN/45/2015 X-275 (H1N1) ANTIGEN (FORMALDEHYDE INACTIVATED), INFLUENZA A VIRUS A/SINGAPORE/INFIMH-16-0019/2016 IVR-186 (H3N2) ANTIGEN (FORMALDEHYDE INACTIVATED), INFLUENZA B VIRUS B/PHUKET/3073/2013 ANTIGEN (FORMALDEHYDE INACTIVATED), AND INFLUENZA B VIRUS B/MARYLAND/15/2016 BX-69A ANTIGEN (FORMALDEHYDE INACTIVATED) 0.5 ML: 15; 15; 15; 15 INJECTION, SUSPENSION INTRAMUSCULAR at 08:09

## 2018-11-17 RX ADMIN — RIFAXIMIN 200 MG: 200 TABLET ORAL at 19:48

## 2018-11-17 RX ADMIN — ALBUMIN (HUMAN) 25 G: 0.25 INJECTION, SOLUTION INTRAVENOUS at 02:02

## 2018-11-17 RX ADMIN — ALBUMIN (HUMAN) 25 G: 0.25 INJECTION, SOLUTION INTRAVENOUS at 08:08

## 2018-11-17 RX ADMIN — ALBUMIN (HUMAN) 25 G: 0.25 INJECTION, SOLUTION INTRAVENOUS at 17:26

## 2018-11-17 RX ADMIN — LACTULOSE 30 G: 20 SOLUTION ORAL at 17:26

## 2018-11-17 RX ADMIN — RIFAXIMIN 200 MG: 200 TABLET ORAL at 08:08

## 2018-11-17 RX ADMIN — METOPROLOL TARTRATE 12.5 MG: 25 TABLET ORAL at 19:48

## 2018-11-17 RX ADMIN — RIFAXIMIN 200 MG: 200 TABLET ORAL at 14:13

## 2018-11-17 RX ADMIN — PREDNISOLONE 10 MG: 15 SOLUTION ORAL at 08:09

## 2018-11-17 RX ADMIN — SODIUM CHLORIDE: 900 INJECTION, SOLUTION INTRAVENOUS at 04:20

## 2018-11-17 RX ADMIN — LACTULOSE 30 G: 20 SOLUTION ORAL at 06:04

## 2018-11-17 RX ADMIN — Medication 100 MG: at 08:08

## 2018-11-17 RX ADMIN — FOLIC ACID 1 MG: 1 TABLET ORAL at 08:08

## 2018-11-17 ASSESSMENT — PAIN SCALES - GENERAL
PAINLEVEL_OUTOF10: 0

## 2018-11-17 NOTE — PROGRESS NOTES
Shift assessment complete and documented on flowsheets. VSS. Four eyes skin assessment and MAR handoff completed with previous RN. Repositioned for comfort. Call light and over bed table within reach. Will continue to monitor.

## 2018-11-17 NOTE — PROGRESS NOTES
3200 ml   Net            -2491 ml       Vitals    /62   Pulse 64   Temp 98.6 °F (37 °C) (Oral)   Resp 13   Ht 5' 10\" (1.778 m)   Wt 263 lb 3.7 oz (119.4 kg)   SpO2 98%   BMI 37.77 kg/m²     Exam:    Gen: Awakes to name, Jaundice appearing, NAD, more alert and interactive.,   Lungs: CTA B w/out Rales/Wheezes/Rhonchi, Good respiratory effort, diminished to bases bilat, Easy and non labored breathing. Cor: RRR, S1S2, w/out M/R/G, non-displaced PMI  Vascular: no calf tenderness, negative Herrera's sign, no calf cords  Abdomen: Soft, mild generalized tenderness on light palpation, mod distention, + fluid wave, w/out R/G, w/ +BSx4  Ext: No C/C/ Bilaterally. +1 pitting edema to LE bilat. Neuro: + Asterixis, Slowed neural responses, Grossly non focal, Neurovascularly intact w/ Sensory/Motor intact UE/LE Bilaterally. Skin: Skin color Jaundiced, texture, turgor normal. Without Rashes/Lesions. Musculoskeletal: No evidence of joint instability, strength normal, no evidence of muscle atrophy, no deformities present    Data    Recent Labs      11/15/18   1016  11/16/18   0415   WBC  11.7*  9.7   HGB  13.6  12.2*   HCT  39.8*  35.1*   PLT  139  104*      Recent Labs      11/15/18   2348  11/16/18   0415  11/17/18   0420   NA  126*  129*  131*   K  5.1  5.0  4.9   CL  96*  99  101   CO2  19*  19*  21   PHOS  4.2  3.8  2.7   BUN  50*  46*  28*   CREATININE  1.5*  1.1  <0.5*     Recent Labs      11/15/18   1016  11/16/18   0415   AST  80*  66*   ALT  79*  64*   BILIDIR   --   >10.0*   BILITOT  20.0*  19.0*   ALKPHOS  180*  140*     No results for input(s): INR in the last 72 hours. No results for input(s): CKTOTAL, CKMB, CKMBINDEX, TROPONINI in the last 72 hours.     Consults:     IP CONSULT TO HOSPITALIST  IP CONSULT TO GI  IP CONSULT TO NEPHROLOGY  IP CONSULT TO CRITICAL CARE    ASSESSMENT AND PLAN      Active Hospital Problems    Diagnosis Date Noted    [facility] [K74.60, R18.8]    

## 2018-11-18 LAB
ALBUMIN SERPL-MCNC: 3.3 G/DL (ref 3.4–5)
ANION GAP SERPL CALCULATED.3IONS-SCNC: 8 MMOL/L (ref 3–16)
BUN BLDV-MCNC: 15 MG/DL (ref 7–20)
CALCIUM SERPL-MCNC: 9.7 MG/DL (ref 8.3–10.6)
CHLORIDE BLD-SCNC: 104 MMOL/L (ref 99–110)
CO2: 21 MMOL/L (ref 21–32)
CREAT SERPL-MCNC: <0.5 MG/DL (ref 0.9–1.3)
GFR AFRICAN AMERICAN: >60
GFR NON-AFRICAN AMERICAN: >60
GLUCOSE BLD-MCNC: 123 MG/DL (ref 70–99)
MAGNESIUM: 1.8 MG/DL (ref 1.8–2.4)
PHOSPHORUS: 1.9 MG/DL (ref 2.5–4.9)
POTASSIUM SERPL-SCNC: 4.8 MMOL/L (ref 3.5–5.1)
SODIUM BLD-SCNC: 133 MMOL/L (ref 136–145)

## 2018-11-18 PROCEDURE — 6360000002 HC RX W HCPCS: Performed by: INTERNAL MEDICINE

## 2018-11-18 PROCEDURE — 6370000000 HC RX 637 (ALT 250 FOR IP): Performed by: INTERNAL MEDICINE

## 2018-11-18 PROCEDURE — P9047 ALBUMIN (HUMAN), 25%, 50ML: HCPCS | Performed by: INTERNAL MEDICINE

## 2018-11-18 PROCEDURE — 80069 RENAL FUNCTION PANEL: CPT

## 2018-11-18 PROCEDURE — 2580000003 HC RX 258: Performed by: PEDIATRICS

## 2018-11-18 PROCEDURE — 1200000000 HC SEMI PRIVATE

## 2018-11-18 PROCEDURE — 83735 ASSAY OF MAGNESIUM: CPT

## 2018-11-18 PROCEDURE — 36415 COLL VENOUS BLD VENIPUNCTURE: CPT

## 2018-11-18 PROCEDURE — 6370000000 HC RX 637 (ALT 250 FOR IP): Performed by: PEDIATRICS

## 2018-11-18 RX ORDER — LACTULOSE 10 G/15ML
30 SOLUTION ORAL 2 TIMES DAILY
Status: DISCONTINUED | OUTPATIENT
Start: 2018-11-18 | End: 2018-11-20 | Stop reason: HOSPADM

## 2018-11-18 RX ORDER — SODIUM CHLORIDE 9 MG/ML
INJECTION, SOLUTION INTRAVENOUS
Status: DISPENSED
Start: 2018-11-18 | End: 2018-11-18

## 2018-11-18 RX ADMIN — LACTULOSE 30 G: 20 SOLUTION ORAL at 12:01

## 2018-11-18 RX ADMIN — PHENAZOPYRIDINE HYDROCHLORIDE 200 MG: 100 TABLET ORAL at 12:01

## 2018-11-18 RX ADMIN — PHENAZOPYRIDINE HYDROCHLORIDE 200 MG: 100 TABLET ORAL at 17:26

## 2018-11-18 RX ADMIN — PREDNISOLONE 10 MG: 15 SOLUTION ORAL at 12:01

## 2018-11-18 RX ADMIN — ALBUMIN (HUMAN) 25 G: 0.25 INJECTION, SOLUTION INTRAVENOUS at 03:03

## 2018-11-18 RX ADMIN — ALBUMIN (HUMAN) 25 G: 0.25 INJECTION, SOLUTION INTRAVENOUS at 17:27

## 2018-11-18 RX ADMIN — RIFAXIMIN 200 MG: 200 TABLET ORAL at 14:07

## 2018-11-18 RX ADMIN — SODIUM CHLORIDE, PRESERVATIVE FREE 10 ML: 5 INJECTION INTRAVENOUS at 09:10

## 2018-11-18 RX ADMIN — METOPROLOL TARTRATE 12.5 MG: 25 TABLET ORAL at 20:26

## 2018-11-18 RX ADMIN — LACTULOSE 30 G: 20 SOLUTION ORAL at 20:26

## 2018-11-18 RX ADMIN — LACTULOSE 30 G: 20 SOLUTION ORAL at 00:20

## 2018-11-18 RX ADMIN — LACTULOSE 30 G: 20 SOLUTION ORAL at 06:40

## 2018-11-18 RX ADMIN — PHENAZOPYRIDINE HYDROCHLORIDE 200 MG: 100 TABLET ORAL at 09:10

## 2018-11-18 RX ADMIN — SODIUM CHLORIDE, PRESERVATIVE FREE 10 ML: 5 INJECTION INTRAVENOUS at 17:27

## 2018-11-18 RX ADMIN — RIFAXIMIN 200 MG: 200 TABLET ORAL at 10:20

## 2018-11-18 RX ADMIN — SODIUM CHLORIDE, PRESERVATIVE FREE 10 ML: 5 INJECTION INTRAVENOUS at 20:26

## 2018-11-18 RX ADMIN — PANTOPRAZOLE SODIUM 40 MG: 40 TABLET, DELAYED RELEASE ORAL at 09:09

## 2018-11-18 RX ADMIN — Medication 100 MG: at 09:10

## 2018-11-18 RX ADMIN — SODIUM CHLORIDE, PRESERVATIVE FREE 10 ML: 5 INJECTION INTRAVENOUS at 10:20

## 2018-11-18 RX ADMIN — FOLIC ACID 1 MG: 1 TABLET ORAL at 09:09

## 2018-11-18 RX ADMIN — METOPROLOL TARTRATE 12.5 MG: 25 TABLET ORAL at 09:09

## 2018-11-18 RX ADMIN — ALBUMIN (HUMAN) 25 G: 0.25 INJECTION, SOLUTION INTRAVENOUS at 10:20

## 2018-11-18 RX ADMIN — RIFAXIMIN 200 MG: 200 TABLET ORAL at 20:25

## 2018-11-18 ASSESSMENT — PAIN SCALES - GENERAL: PAINLEVEL_OUTOF10: 0

## 2018-11-19 LAB
ALBUMIN SERPL-MCNC: 4 G/DL (ref 3.4–5)
ANION GAP SERPL CALCULATED.3IONS-SCNC: 10 MMOL/L (ref 3–16)
BODY FLUID CULTURE, STERILE: NORMAL
BUN BLDV-MCNC: 10 MG/DL (ref 7–20)
CALCIUM SERPL-MCNC: 9.7 MG/DL (ref 8.3–10.6)
CHLORIDE BLD-SCNC: 102 MMOL/L (ref 99–110)
CO2: 23 MMOL/L (ref 21–32)
CREAT SERPL-MCNC: <0.5 MG/DL (ref 0.9–1.3)
GFR AFRICAN AMERICAN: >60
GFR NON-AFRICAN AMERICAN: >60
GLUCOSE BLD-MCNC: 100 MG/DL (ref 70–99)
GRAM STAIN RESULT: NORMAL
ORGANISM: ABNORMAL
PHOSPHORUS: 2.4 MG/DL (ref 2.5–4.9)
POTASSIUM SERPL-SCNC: 4.3 MMOL/L (ref 3.5–5.1)
SODIUM BLD-SCNC: 135 MMOL/L (ref 136–145)
URINE CULTURE, ROUTINE: ABNORMAL
URINE CULTURE, ROUTINE: ABNORMAL

## 2018-11-19 PROCEDURE — 97110 THERAPEUTIC EXERCISES: CPT

## 2018-11-19 PROCEDURE — G8979 MOBILITY GOAL STATUS: HCPCS

## 2018-11-19 PROCEDURE — G8978 MOBILITY CURRENT STATUS: HCPCS

## 2018-11-19 PROCEDURE — 1200000000 HC SEMI PRIVATE

## 2018-11-19 PROCEDURE — 36415 COLL VENOUS BLD VENIPUNCTURE: CPT

## 2018-11-19 PROCEDURE — 97161 PT EVAL LOW COMPLEX 20 MIN: CPT

## 2018-11-19 PROCEDURE — 6370000000 HC RX 637 (ALT 250 FOR IP): Performed by: INTERNAL MEDICINE

## 2018-11-19 PROCEDURE — 2580000003 HC RX 258: Performed by: PEDIATRICS

## 2018-11-19 PROCEDURE — 6370000000 HC RX 637 (ALT 250 FOR IP): Performed by: PEDIATRICS

## 2018-11-19 PROCEDURE — G8988 SELF CARE GOAL STATUS: HCPCS

## 2018-11-19 PROCEDURE — P9047 ALBUMIN (HUMAN), 25%, 50ML: HCPCS | Performed by: INTERNAL MEDICINE

## 2018-11-19 PROCEDURE — 97116 GAIT TRAINING THERAPY: CPT

## 2018-11-19 PROCEDURE — 97166 OT EVAL MOD COMPLEX 45 MIN: CPT

## 2018-11-19 PROCEDURE — G8989 SELF CARE D/C STATUS: HCPCS

## 2018-11-19 PROCEDURE — 80069 RENAL FUNCTION PANEL: CPT

## 2018-11-19 PROCEDURE — G8987 SELF CARE CURRENT STATUS: HCPCS

## 2018-11-19 PROCEDURE — 6360000002 HC RX W HCPCS: Performed by: INTERNAL MEDICINE

## 2018-11-19 PROCEDURE — G8980 MOBILITY D/C STATUS: HCPCS

## 2018-11-19 RX ORDER — FUROSEMIDE 40 MG/1
40 TABLET ORAL 2 TIMES DAILY
Status: DISCONTINUED | OUTPATIENT
Start: 2018-11-19 | End: 2018-11-20 | Stop reason: HOSPADM

## 2018-11-19 RX ADMIN — FOLIC ACID 1 MG: 1 TABLET ORAL at 09:58

## 2018-11-19 RX ADMIN — LACTULOSE 30 G: 20 SOLUTION ORAL at 20:39

## 2018-11-19 RX ADMIN — Medication 100 MG: at 09:58

## 2018-11-19 RX ADMIN — ALBUMIN (HUMAN) 25 G: 0.25 INJECTION, SOLUTION INTRAVENOUS at 01:52

## 2018-11-19 RX ADMIN — METOPROLOL TARTRATE 12.5 MG: 25 TABLET ORAL at 20:39

## 2018-11-19 RX ADMIN — RIFAXIMIN 200 MG: 200 TABLET ORAL at 20:38

## 2018-11-19 RX ADMIN — METOPROLOL TARTRATE 12.5 MG: 25 TABLET ORAL at 09:59

## 2018-11-19 RX ADMIN — ALBUMIN (HUMAN) 25 G: 0.25 INJECTION, SOLUTION INTRAVENOUS at 17:55

## 2018-11-19 RX ADMIN — FUROSEMIDE 40 MG: 40 TABLET ORAL at 17:57

## 2018-11-19 RX ADMIN — PANTOPRAZOLE SODIUM 40 MG: 40 TABLET, DELAYED RELEASE ORAL at 09:59

## 2018-11-19 RX ADMIN — SODIUM CHLORIDE, PRESERVATIVE FREE 10 ML: 5 INJECTION INTRAVENOUS at 10:00

## 2018-11-19 RX ADMIN — PREDNISOLONE 5 MG: 15 SOLUTION ORAL at 09:58

## 2018-11-19 RX ADMIN — PHENAZOPYRIDINE HYDROCHLORIDE 200 MG: 100 TABLET ORAL at 09:58

## 2018-11-19 RX ADMIN — PHENAZOPYRIDINE HYDROCHLORIDE 200 MG: 100 TABLET ORAL at 17:56

## 2018-11-19 RX ADMIN — PHENAZOPYRIDINE HYDROCHLORIDE 200 MG: 100 TABLET ORAL at 13:49

## 2018-11-19 RX ADMIN — LACTULOSE 30 G: 20 SOLUTION ORAL at 09:57

## 2018-11-19 RX ADMIN — RIFAXIMIN 200 MG: 200 TABLET ORAL at 13:47

## 2018-11-19 RX ADMIN — ALBUMIN (HUMAN) 25 G: 0.25 INJECTION, SOLUTION INTRAVENOUS at 09:57

## 2018-11-19 RX ADMIN — RIFAXIMIN 200 MG: 200 TABLET ORAL at 09:59

## 2018-11-19 NOTE — PROGRESS NOTES
Hospitalist Progress Note      PCP: FLORIAN Hightower - CNP    Date of Admission: 11/13/2018    Chief Complaint on Admission: Jaundice, PCP (Dr. Tiki Valdez) encouraged formal evaluation for labs 11/12/18 showing elevated creatinine / FREDRICK       History Of Present Illness: The patient is a 41 y.o. male who presents with a hx of cirrhosis, followed by his liver specialist (Dr. Tiki Vadlez), who presented to the ED as instructed given elevation of his creatinine.      Has ascites - removed about 4.5 quarts on 10/24/18       He has bilateral lower abdominal pain, started at least a month ago, cramping in nature, constant, 7/10. Alleviated with dilaudid, aggravated by hot sauce consumption. He not been on lactulose in at least 2 weeks (but was on lactulose during a prior hospitalization). Not on any pain medications at home. Pt Seen/Examined and Chart Reviewed. Admitting dx: Cirrhosis    SUBJECTIVE:     Continues to have labile mood. He is irritable. He understands that he needs to take lactulose but does not like loose stools      OBJECTIVE:     Allergies  Patient has no known allergies.     Medications      Scheduled Meds:   furosemide  40 mg Oral BID    lactulose  30 g Oral BID    phenazopyridine  200 mg Oral TID WC    metoprolol tartrate  12.5 mg Oral BID    rifaximin  200 mg Oral TID    prednisoLONE  5 mg Oral Daily    albumin human  25 g Intravenous Q8H    pantoprazole  40 mg Oral Daily    sodium chloride flush  10 mL Intravenous 2 times per day    thiamine  100 mg Oral Daily    folic acid  1 mg Oral Daily       Infusions:      PRN Meds:  magnesium hydroxide, ondansetron, sodium chloride flush, LORazepam **OR** LORazepam **OR** LORazepam **OR** LORazepam **OR** LORazepam **OR** LORazepam **OR** LORazepam **OR** LORazepam    Intake and Output     Intake/Output Summary (Last 24 hours) at 11/19/18 1450  Last data filed at 11/18/18 1540   Gross per 24 hour   Intake                0 ml   Output

## 2018-11-20 VITALS
DIASTOLIC BLOOD PRESSURE: 75 MMHG | SYSTOLIC BLOOD PRESSURE: 115 MMHG | RESPIRATION RATE: 18 BRPM | HEART RATE: 72 BPM | OXYGEN SATURATION: 97 % | HEIGHT: 70 IN | WEIGHT: 281.09 LBS | TEMPERATURE: 98.5 F | BODY MASS INDEX: 40.24 KG/M2

## 2018-11-20 LAB
ALBUMIN SERPL-MCNC: 3.7 G/DL (ref 3.4–5)
ANION GAP SERPL CALCULATED.3IONS-SCNC: 11 MMOL/L (ref 3–16)
BUN BLDV-MCNC: 10 MG/DL (ref 7–20)
CALCIUM SERPL-MCNC: 9.1 MG/DL (ref 8.3–10.6)
CHLORIDE BLD-SCNC: 102 MMOL/L (ref 99–110)
CO2: 22 MMOL/L (ref 21–32)
CREAT SERPL-MCNC: <0.5 MG/DL (ref 0.9–1.3)
GFR AFRICAN AMERICAN: >60
GFR NON-AFRICAN AMERICAN: >60
GLUCOSE BLD-MCNC: 101 MG/DL (ref 70–99)
MAGNESIUM: 1.4 MG/DL (ref 1.8–2.4)
PHOSPHORUS: 2.7 MG/DL (ref 2.5–4.9)
POTASSIUM SERPL-SCNC: 4.2 MMOL/L (ref 3.5–5.1)
SODIUM BLD-SCNC: 135 MMOL/L (ref 136–145)

## 2018-11-20 PROCEDURE — 6370000000 HC RX 637 (ALT 250 FOR IP): Performed by: INTERNAL MEDICINE

## 2018-11-20 PROCEDURE — 6360000002 HC RX W HCPCS: Performed by: INTERNAL MEDICINE

## 2018-11-20 PROCEDURE — 83735 ASSAY OF MAGNESIUM: CPT

## 2018-11-20 PROCEDURE — P9047 ALBUMIN (HUMAN), 25%, 50ML: HCPCS | Performed by: INTERNAL MEDICINE

## 2018-11-20 PROCEDURE — 6370000000 HC RX 637 (ALT 250 FOR IP): Performed by: PEDIATRICS

## 2018-11-20 PROCEDURE — 36415 COLL VENOUS BLD VENIPUNCTURE: CPT

## 2018-11-20 PROCEDURE — 2580000003 HC RX 258: Performed by: PEDIATRICS

## 2018-11-20 PROCEDURE — 80069 RENAL FUNCTION PANEL: CPT

## 2018-11-20 RX ORDER — LACTULOSE 10 G/15ML
30 SOLUTION ORAL 2 TIMES DAILY
Qty: 473 ML | Refills: 1 | Status: SHIPPED | OUTPATIENT
Start: 2018-11-20

## 2018-11-20 RX ORDER — PREDNISOLONE 15 MG/5ML
5 SOLUTION ORAL DAILY
Qty: 11.9 ML | Refills: 0 | Status: SHIPPED | OUTPATIENT
Start: 2018-11-21 | End: 2018-11-28

## 2018-11-20 RX ORDER — FUROSEMIDE 40 MG/1
40 TABLET ORAL 2 TIMES DAILY
Qty: 60 TABLET | Refills: 3 | Status: SHIPPED | OUTPATIENT
Start: 2018-11-20

## 2018-11-20 RX ADMIN — SODIUM CHLORIDE, PRESERVATIVE FREE 10 ML: 5 INJECTION INTRAVENOUS at 08:39

## 2018-11-20 RX ADMIN — RIFAXIMIN 200 MG: 200 TABLET ORAL at 08:38

## 2018-11-20 RX ADMIN — Medication 100 MG: at 08:37

## 2018-11-20 RX ADMIN — PHENAZOPYRIDINE HYDROCHLORIDE 200 MG: 100 TABLET ORAL at 08:37

## 2018-11-20 RX ADMIN — PHENAZOPYRIDINE HYDROCHLORIDE 200 MG: 100 TABLET ORAL at 11:58

## 2018-11-20 RX ADMIN — ALBUMIN (HUMAN) 25 G: 0.25 INJECTION, SOLUTION INTRAVENOUS at 10:53

## 2018-11-20 RX ADMIN — FOLIC ACID 1 MG: 1 TABLET ORAL at 08:37

## 2018-11-20 RX ADMIN — FUROSEMIDE 40 MG: 40 TABLET ORAL at 08:38

## 2018-11-20 RX ADMIN — PREDNISOLONE 5 MG: 15 SOLUTION ORAL at 08:38

## 2018-11-20 RX ADMIN — METOPROLOL TARTRATE 12.5 MG: 25 TABLET ORAL at 08:38

## 2018-11-20 RX ADMIN — ALBUMIN (HUMAN) 25 G: 0.25 INJECTION, SOLUTION INTRAVENOUS at 02:13

## 2018-11-20 RX ADMIN — PANTOPRAZOLE SODIUM 40 MG: 40 TABLET, DELAYED RELEASE ORAL at 08:38

## 2018-11-20 RX ADMIN — RIFAXIMIN 200 MG: 200 TABLET ORAL at 13:47

## 2018-11-20 NOTE — PLAN OF CARE
Problem: Mental Status - Impaired:  Goal: Mental status will improve  Mental status will improve  Outcome: Ongoing  Pt alert/oriented x4 this AM
Problem: Pain:  Goal: Pain level will decrease  Pain level will decrease   Outcome: Ongoing      Problem: Falls - Risk of:  Goal: Will remain free from falls  Will remain free from falls   Outcome: Ongoing  Calls out approprietly.  Bed alarm in place   Goal: Absence of physical injury  Absence of physical injury   Outcome: Ongoing      Problem: Fluid Volume:  Goal: Ability to achieve a balanced intake and output will improve  Ability to achieve a balanced intake and output will improve   Outcome: Ongoing
skin and  mucous membranes.    Outcome: Ongoing      Problem: Cardiac:  Goal: Ability to maintain vital signs within normal range will improve  Ability to maintain vital signs within normal range will improve  Outcome: Ongoing    Goal: Cardiovascular alteration will improve  Cardiovascular alteration will improve  Outcome: Ongoing      Problem: Health Behavior:  Goal: Will modify at least one risk factor affecting health status  Will modify at least one risk factor affecting health status  Outcome: Ongoing    Goal: Identification of resources available to assist in meeting health care needs will improve  Identification of resources available to assist in meeting health care needs will improve  Outcome: Ongoing

## 2018-11-21 LAB
EKG ATRIAL RATE: 75 BPM
EKG DIAGNOSIS: NORMAL
EKG P AXIS: 56 DEGREES
EKG P-R INTERVAL: 144 MS
EKG Q-T INTERVAL: 386 MS
EKG QRS DURATION: 86 MS
EKG QTC CALCULATION (BAZETT): 431 MS
EKG R AXIS: 72 DEGREES
EKG T AXIS: 21 DEGREES
EKG VENTRICULAR RATE: 75 BPM

## 2018-11-30 NOTE — DISCHARGE SUMMARY
Rales/Wheezes/Rhonchi. Cardiovascular:  Regular rate and rhythm with normal S1/S2 without murmurs, rubs or gallops. Abdomen: Soft, non-tender, non-distended with normal bowel sounds. Musculoskeletal:  No clubbing, cyanosis or edema bilaterally. Full range of motion without deformity. Skin: Skin color, texture, turgor normal.  No rashes or lesions. Neurologic:  Neurovascularly intact without any focal sensory/motor deficits. Cranial nerves: II-XII intact, grossly non-focal.  Psychiatric:  Alert and oriented, thought content appropriate, normal insight  Capillary Refill: Brisk,< 3 seconds   Peripheral Pulses: +2 palpable, equal bilaterally       Labs: For convenience and continuity at follow-up the following most recent labs are provided:      CBC:    Lab Results   Component Value Date    WBC 9.7 11/16/2018    HGB 12.2 11/16/2018    HCT 35.1 11/16/2018     11/16/2018       Renal:    Lab Results   Component Value Date     11/20/2018    K 4.2 11/20/2018    K 5.4 11/13/2018     11/20/2018    CO2 22 11/20/2018    BUN 10 11/20/2018    CREATININE <0.5 11/20/2018    CALCIUM 9.1 11/20/2018    PHOS 2.7 11/20/2018         Significant Diagnostic Studies    Radiology:   XR ABDOMEN (KUB) (SINGLE AP VIEW)   Final Result   Nasogastric tube side port projects in the GE junction. Recommend advancing   3-4 cm for more optimal positioning. XR ABDOMEN (KUB) (SINGLE AP VIEW)   Final Result   Gastric drain terminates in the gastric body. XR CHEST PORTABLE   Final Result   No acute findings. CT ABDOMEN PELVIS WO CONTRAST Additional Contrast? None   Final Result   Cirrhotic morphology liver with evidence of portal venous hypertension   including venous collaterals, mesenteric edema and large amount of   abdominopelvic ascites. Small left pleural effusion with minimal dependent left lower lobe airspace   disease, likely atelectasis.                 Consults:     IP CONSULT TO GI  IP CONSULT TO NEPHROLOGY  IP CONSULT TO CRITICAL CARE    Disposition:  homw    Condition at Discharge: Stable    Discharge Instructions/Follow-up:  FLORIAN Valiente CNP, Dr in 1-2 weeks  Dr Celio Guy in 1-2 weeks    Code Status:  Prior     Activity: activity as tolerated    Diet: high protein diet      Discharge Medications:     Discharge Medication List as of 11/20/2018  1:34 PM           Details   metoprolol tartrate (LOPRESSOR) 25 MG tablet Take 0.5 tablets by mouth 2 times daily, Disp-60 tablet, R-3Normal      lactulose (CHRONULAC) 10 GM/15ML solution Take 45 mLs by mouth 2 times daily, Disp-473 mL, R-1Normal      rifaximin (XIFAXAN) 200 MG tablet Take 1 tablet by mouth 3 times daily for 10 days, Disp-30 tablet, R-0Normal              Details   prednisoLONE 15 MG/5ML solution Take 1.7 mLs by mouth daily for 7 days, Disp-11.9 mL, R-0Normal      furosemide (LASIX) 40 MG tablet Take 1 tablet by mouth 2 times daily, Disp-60 tablet, R-3Normal              Details   vitamin B-1 100 MG tablet Take 1 tablet by mouth daily, Disp-30 tablet, R-3      pantoprazole (PROTONIX) 40 MG tablet Take 1 tablet by mouth daily, Disp-30 tablet, R-1             Time Spent on discharge is more than 45 minutes in the examination, evaluation, counseling and review of medications and discharge plan. Signed:    Ana Rosa Philip MD   11/30/2018      Thank you FLORIAN Valiente CNP for the opportunity to be involved in this patient's care. If you have any questions or concerns please feel free to contact me at [phone].

## (undated) DEVICE — CONMED SCOPE SAVER BITE BLOCK, 20X27 MM: Brand: SCOPE SAVER

## (undated) DEVICE — FORCEP BX STD CAP 240CM RAD JAW 4

## (undated) DEVICE — ENDO CARRY-ON PROCEDURE KIT INCLUDES SUCTION TUBING, LUBRICANT, GAUZE, BIOHAZARD STICKER, TRANSPORT PAD AND INTERCEPT BEDSIDE KIT.: Brand: ENDO CARRY-ON PROCEDURE KIT